# Patient Record
Sex: FEMALE | Race: WHITE | Employment: FULL TIME | URBAN - NONMETROPOLITAN AREA
[De-identification: names, ages, dates, MRNs, and addresses within clinical notes are randomized per-mention and may not be internally consistent; named-entity substitution may affect disease eponyms.]

---

## 2017-11-30 RX ORDER — NORGESTIMATE AND ETHINYL ESTRADIOL
KIT
Qty: 28 TABLET | Refills: 12 | Status: SHIPPED | OUTPATIENT
Start: 2017-11-30 | End: 2019-06-25 | Stop reason: ALTCHOICE

## 2019-06-25 ENCOUNTER — OFFICE VISIT (OUTPATIENT)
Dept: FAMILY MEDICINE CLINIC | Age: 39
End: 2019-06-25
Payer: COMMERCIAL

## 2019-06-25 VITALS
OXYGEN SATURATION: 98 % | DIASTOLIC BLOOD PRESSURE: 86 MMHG | WEIGHT: 203 LBS | SYSTOLIC BLOOD PRESSURE: 132 MMHG | BODY MASS INDEX: 32.62 KG/M2 | HEIGHT: 66 IN | HEART RATE: 79 BPM

## 2019-06-25 DIAGNOSIS — Z00.00 PREVENTATIVE HEALTH CARE: ICD-10-CM

## 2019-06-25 DIAGNOSIS — Z00.00 PREVENTATIVE HEALTH CARE: Primary | ICD-10-CM

## 2019-06-25 DIAGNOSIS — R41.840 CONCENTRATION DEFICIT: ICD-10-CM

## 2019-06-25 DIAGNOSIS — E28.2 PCOS (POLYCYSTIC OVARIAN SYNDROME): ICD-10-CM

## 2019-06-25 DIAGNOSIS — E28.2 PCO (POLYCYSTIC OVARIES): ICD-10-CM

## 2019-06-25 DIAGNOSIS — Z12.39 SCREENING FOR MALIGNANT NEOPLASM OF BREAST: ICD-10-CM

## 2019-06-25 LAB
ALBUMIN SERPL-MCNC: 4.3 G/DL (ref 3.5–4.6)
ALP BLD-CCNC: 54 U/L (ref 40–130)
ALT SERPL-CCNC: 8 U/L (ref 0–33)
ANION GAP SERPL CALCULATED.3IONS-SCNC: 15 MEQ/L (ref 9–15)
AST SERPL-CCNC: 15 U/L (ref 0–35)
BILIRUB SERPL-MCNC: 0.3 MG/DL (ref 0.2–0.7)
BUN BLDV-MCNC: 12 MG/DL (ref 6–20)
CALCIUM SERPL-MCNC: 8.9 MG/DL (ref 8.5–9.9)
CHLORIDE BLD-SCNC: 105 MEQ/L (ref 95–107)
CHOLESTEROL, TOTAL: 172 MG/DL (ref 0–199)
CO2: 20 MEQ/L (ref 20–31)
CREAT SERPL-MCNC: 0.67 MG/DL (ref 0.5–0.9)
GFR AFRICAN AMERICAN: >60
GFR NON-AFRICAN AMERICAN: >60
GLOBULIN: 2.8 G/DL (ref 2.3–3.5)
GLUCOSE BLD-MCNC: 98 MG/DL (ref 70–99)
HBA1C MFR BLD: 5.3 % (ref 4.8–5.9)
HCT VFR BLD CALC: 40.8 % (ref 37–47)
HDLC SERPL-MCNC: 52 MG/DL (ref 40–59)
HEMOGLOBIN: 13.7 G/DL (ref 12–16)
LDL CHOLESTEROL CALCULATED: 107 MG/DL (ref 0–129)
MCH RBC QN AUTO: 31.3 PG (ref 27–31.3)
MCHC RBC AUTO-ENTMCNC: 33.4 % (ref 33–37)
MCV RBC AUTO: 93.5 FL (ref 82–100)
PDW BLD-RTO: 14 % (ref 11.5–14.5)
PLATELET # BLD: 256 K/UL (ref 130–400)
POTASSIUM SERPL-SCNC: 4.2 MEQ/L (ref 3.4–4.9)
RBC # BLD: 4.37 M/UL (ref 4.2–5.4)
SODIUM BLD-SCNC: 140 MEQ/L (ref 135–144)
TOTAL PROTEIN: 7.1 G/DL (ref 6.3–8)
TRIGL SERPL-MCNC: 67 MG/DL (ref 0–150)
TSH SERPL DL<=0.05 MIU/L-ACNC: 1.43 UIU/ML (ref 0.44–3.86)
VITAMIN D 25-HYDROXY: 31.2 NG/ML (ref 30–100)
WBC # BLD: 7.2 K/UL (ref 4.8–10.8)

## 2019-06-25 PROCEDURE — 99386 PREV VISIT NEW AGE 40-64: CPT | Performed by: FAMILY MEDICINE

## 2019-06-25 ASSESSMENT — PATIENT HEALTH QUESTIONNAIRE - PHQ9
SUM OF ALL RESPONSES TO PHQ QUESTIONS 1-9: 0
1. LITTLE INTEREST OR PLEASURE IN DOING THINGS: 0
SUM OF ALL RESPONSES TO PHQ QUESTIONS 1-9: 0
SUM OF ALL RESPONSES TO PHQ9 QUESTIONS 1 & 2: 0
2. FEELING DOWN, DEPRESSED OR HOPELESS: 0

## 2019-06-25 NOTE — PROGRESS NOTES
Chief Complaint   Patient presents with    Establish Care     discuss ADD, brothers have it, starting to notice some symtoms        HPI:  Teri Jaramillo is a 37 y.o. 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 No current facility-administered medications for this visit. Past Medical History:   Diagnosis Date    Pap smear for cervical cancer screening 01/12/2011    neg.     PCO (polycystic ovaries)     PCOS (polycystic ovarian syndrome)      Past Surgical History:   Procedure Laterality Date    TONSILLECTOMY      TYMPANOSTOMY TUBE PLACEMENT      UTERINE FIBROID SURGERY       Family History   Problem Relation Age of Onset    Substance Abuse Father         alcohol    Cancer Paternal Grandfather         throat cancer    High Blood Pressure Mother     Asthma Mother     High Cholesterol Mother     Cancer Maternal Grandfather         lung     Social History     Socioeconomic History    Marital status: Single     Spouse name: None    Number of children: None    Years of education: None    Highest education level: None   Occupational History    None   Social Needs    Financial resource strain: None    Food insecurity:     Worry: None     Inability: None    Transportation needs:     Medical: None     Non-medical: None   Tobacco Use    Smoking status: Current Every Day Smoker     Packs/day: 0.50     Years: 10.00     Pack years: 5.00     Types: Cigarettes    Smokeless tobacco: Never Used   Substance and Sexual Activity    Alcohol use: Yes     Comment: occasionally    Drug use: No    Sexual activity: Not Currently   Lifestyle  Physical activity:     Days per week: None     Minutes per session: None    Stress: None   Relationships    Social connections:     Talks on phone: None     Gets together: None     Attends Faith service: None     Active member of club or organization: None     Attends meetings of clubs or organizations: None     Relationship status: None    Intimate partner violence:     Fear of current or ex partner: None     Emotionally abused: None     Physically abused: None     Forced sexual activity: None   Other Topics Concern    None   Social History Narrative    None     Allergies   Allergen Reactions    Codeine        Review of Systems:   General ROS: negative for - chills, fatigue, fever, malaise, weight gain or weight loss  Respiratory ROS: no cough, shortness of breath, or wheezing  Cardiovascular ROS: no chest pain or dyspnea on exertion  Gastrointestinal ROS: no abdominal pain, change in bowel habits, or black or bloody stools  Genito-Urinary ROS: no dysuria, trouble voiding  Musculoskeletal ROS: negative for - gait disturbance, joint pain or joint stiffness  Neurological ROS: per HPI  In general patient otherwise reports feeling well. Physical Exam:  /86 (Site: Right Upper Arm)   Pulse 79   Ht 5' 6\" (1.676 m)   Wt 203 lb (92.1 kg)   SpO2 98%   Breastfeeding? No   BMI 32.77 kg/m²     Gen: Well, NAD, Alert, Oriented x 3   HEENT: EOMI, eyes clear, MMM  Skin: without rash or jaundice  Neck: no significant lymphadenopathy or thyromegaly  Lungs: CTA B w/out Rales/Wheezes/Rhonchi, Good respiratory effort   Heart: RRR, S1S2, w/out M/R/G, non-displaced PMI   Ext: No C/C/E Bilaterally. Neuro: Neurovascularly intact w/ Sensory/Motor intact UE/LE Bilaterally.     Lab Results   Component Value Date    WBC 7.8 10/02/2014    HGB 14.3 10/02/2014    HCT 45.0 10/02/2014     10/02/2014    CHOL 199 10/02/2014    TRIG 93 10/02/2014    HDL 51 10/02/2014    ALT 18 10/02/2014    AST 19 10/02/2014    NA

## 2019-06-27 LAB — HIV 1,2 COMBO ANTIGEN/ANTIBODY: NEGATIVE

## 2019-07-02 ENCOUNTER — HOSPITAL ENCOUNTER (OUTPATIENT)
Dept: WOMENS IMAGING | Age: 39
Discharge: HOME | End: 2019-07-04
Payer: COMMERCIAL

## 2019-07-02 DIAGNOSIS — Z12.39 SCREENING FOR MALIGNANT NEOPLASM OF BREAST: ICD-10-CM

## 2019-07-02 PROCEDURE — 77063 BREAST TOMOSYNTHESIS BI: CPT

## 2019-07-05 DIAGNOSIS — R92.8 ABNORMAL MAMMOGRAPHY: Primary | ICD-10-CM

## 2019-11-19 ENCOUNTER — OFFICE VISIT (OUTPATIENT)
Dept: FAMILY MEDICINE CLINIC | Age: 39
End: 2019-11-19
Payer: COMMERCIAL

## 2019-11-19 VITALS
BODY MASS INDEX: 33.03 KG/M2 | WEIGHT: 205.5 LBS | OXYGEN SATURATION: 99 % | DIASTOLIC BLOOD PRESSURE: 70 MMHG | HEIGHT: 66 IN | TEMPERATURE: 97.5 F | SYSTOLIC BLOOD PRESSURE: 120 MMHG | HEART RATE: 68 BPM

## 2019-11-19 DIAGNOSIS — R59.1 LYMPHADENOPATHY OF HEAD AND NECK: Primary | ICD-10-CM

## 2019-11-19 PROCEDURE — 99213 OFFICE O/P EST LOW 20 MIN: CPT | Performed by: NURSE PRACTITIONER

## 2019-11-19 ASSESSMENT — ENCOUNTER SYMPTOMS
SINUS PAIN: 0
RHINORRHEA: 0
VISUAL CHANGE: 0
SORE THROAT: 0
SINUS PRESSURE: 0

## 2019-11-20 ENCOUNTER — HOSPITAL ENCOUNTER (OUTPATIENT)
Dept: ULTRASOUND IMAGING | Age: 39
Discharge: HOME | End: 2019-11-22
Payer: COMMERCIAL

## 2019-11-20 DIAGNOSIS — R59.1 LYMPHADENOPATHY OF HEAD AND NECK: ICD-10-CM

## 2019-11-20 PROCEDURE — 76999 ECHO EXAMINATION PROCEDURE: CPT

## 2019-11-25 DIAGNOSIS — R59.1 LYMPHADENOPATHY OF HEAD AND NECK: Primary | ICD-10-CM

## 2020-11-18 ENCOUNTER — NURSE ONLY (OUTPATIENT)
Dept: PRIMARY CARE CLINIC | Age: 40
End: 2020-11-18

## 2020-11-18 DIAGNOSIS — Z20.822 ENCOUNTER FOR SCREENING LABORATORY TESTING FOR COVID-19 VIRUS: ICD-10-CM

## 2020-11-21 LAB
SARS-COV-2: NOT DETECTED
SOURCE: NORMAL

## 2021-01-14 ENCOUNTER — VIRTUAL VISIT (OUTPATIENT)
Dept: FAMILY MEDICINE CLINIC | Age: 41
End: 2021-01-14
Payer: COMMERCIAL

## 2021-01-14 DIAGNOSIS — B96.89 ACUTE BACTERIAL SINUSITIS: Primary | ICD-10-CM

## 2021-01-14 DIAGNOSIS — J01.90 ACUTE BACTERIAL SINUSITIS: Primary | ICD-10-CM

## 2021-01-14 PROCEDURE — 99213 OFFICE O/P EST LOW 20 MIN: CPT | Performed by: NURSE PRACTITIONER

## 2021-01-14 RX ORDER — AMOXICILLIN 500 MG/1
500 CAPSULE ORAL 2 TIMES DAILY
Qty: 20 CAPSULE | Refills: 0 | Status: SHIPPED | OUTPATIENT
Start: 2021-01-14 | End: 2021-01-24

## 2021-01-14 SDOH — ECONOMIC STABILITY: FOOD INSECURITY: WITHIN THE PAST 12 MONTHS, YOU WORRIED THAT YOUR FOOD WOULD RUN OUT BEFORE YOU GOT MONEY TO BUY MORE.: NEVER TRUE

## 2021-01-14 SDOH — ECONOMIC STABILITY: TRANSPORTATION INSECURITY
IN THE PAST 12 MONTHS, HAS LACK OF TRANSPORTATION KEPT YOU FROM MEETINGS, WORK, OR FROM GETTING THINGS NEEDED FOR DAILY LIVING?: NO

## 2021-01-14 SDOH — ECONOMIC STABILITY: INCOME INSECURITY: HOW HARD IS IT FOR YOU TO PAY FOR THE VERY BASICS LIKE FOOD, HOUSING, MEDICAL CARE, AND HEATING?: NOT HARD AT ALL

## 2021-01-14 ASSESSMENT — ENCOUNTER SYMPTOMS
CHEST TIGHTNESS: 1
SHORTNESS OF BREATH: 0
COUGH: 1
RHINORRHEA: 0
WHEEZING: 0
NAUSEA: 0
SINUS PRESSURE: 1
DIARRHEA: 0
SORE THROAT: 1
VOMITING: 0

## 2021-01-14 ASSESSMENT — PATIENT HEALTH QUESTIONNAIRE - PHQ9
2. FEELING DOWN, DEPRESSED OR HOPELESS: 0
SUM OF ALL RESPONSES TO PHQ QUESTIONS 1-9: 0
SUM OF ALL RESPONSES TO PHQ QUESTIONS 1-9: 0

## 2021-01-14 NOTE — PROGRESS NOTES
The location for this appointment was the [facility] primary care site. TELEHEALTH APPOINTMENT  Patient has been screened to determine that this visit qualifies for a \"Video Visit\". This visit was via video due to the restrictions of the COVID-19 pandemic. All issues as below were discussed and addressed but note a limited visually based physical exam was performed. If it was felt the patient should be evaluated in the clinic there will be comment below demonstrating they were directed there. The patient is aware and has given verbal consent to be billed for this video encounter. The resources used for this visit were [employer] for chart access and [employer]. me    Chief Complaint   Patient presents with    Sinus Problem     did rapid tested neg. head congestion, cough, no gi sx       HPI  Patient is here for covid testing. Had positive patient covid exposure last 1/7. (works as paramedic)  Symptomatic 1/10  Rapid test 1/11 negative  PCR testing done yesterday through company. No results back yet. Feels lots of nasal congestion. Gets a lot of sinus infections, and it feels just like that. Usually tylenol sinus and cold usually works, but is not this time. PMH:    No current outpatient medications on file prior to visit. No current facility-administered medications on file prior to visit. Past Medical History:   Diagnosis Date    Pap smear for cervical cancer screening 01/12/2011    neg.     PCO (polycystic ovaries)     PCOS (polycystic ovarian syndrome)      Past Surgical History:   Procedure Laterality Date    TONSILLECTOMY      TYMPANOSTOMY TUBE PLACEMENT      UTERINE FIBROID SURGERY       Social History     Socioeconomic History    Marital status: Single     Spouse name: Not on file    Number of children: Not on file    Years of education: Not on file    Highest education level: Not on file   Occupational History    Not on file   Social Needs  Financial resource strain: Not hard at all   [employer] insecurity     Worry: Never true     Inability: Never true    Transportation needs     Medical: No     Non-medical: No   Tobacco Use    Smoking status: Current Every Day Smoker     Packs/day: 0.50     Years: 10.00     Pack years: 5.00     Types: Cigarettes    Smokeless tobacco: Never Used   Substance and Sexual Activity    Alcohol use: Yes     Comment: occasionally    Drug use: No    Sexual activity: Not Currently   Lifestyle    Physical activity     Days per week: Not on file     Minutes per session: Not on file    Stress: Not on file   Relationships    Social connections     Talks on phone: Not on file     Gets together: Not on file     Attends Bahai service: Not on file     Active member of club or organization: Not on file     Attends meetings of clubs or organizations: Not on file     Relationship status: Not on file    Intimate partner violence     Fear of current or ex partner: Not on file     Emotionally abused: Not on file     Physically abused: Not on file     Forced sexual activity: Not on file   Other Topics Concern    Not on file   Social History Narrative    Not on file     Family History   Problem Relation Age of Onset    Substance Abuse Father         alcohol    Cancer Paternal Grandfather         throat cancer    High Blood Pressure Mother     Asthma Mother     High Cholesterol Mother     Cancer Maternal Grandfather         lung     Allergies:  Codeine    Review of Systems   Constitutional: Negative for chills, diaphoresis and fever. HENT: Positive for congestion (nasal), postnasal drip, sinus pressure and sore throat (with coughing). Negative for rhinorrhea. Respiratory: Positive for cough (dry) and chest tightness. Negative for shortness of breath and wheezing. Gastrointestinal: Negative for diarrhea, nausea and vomiting. Musculoskeletal: Negative for myalgias. Neurological: Positive for headaches. PHYSICAL EXAM/ RESULTS    (Limited exam: only performed what is available through a visual exam during the video encounter as indicated due to the restrictions of the COVID-19 pandemic)    There were no vitals taken for this visit. Physical Exam  Vitals signs reviewed. Constitutional:       General: She is not in acute distress. Appearance: She is well-developed. She is not diaphoretic. HENT:      Head: Normocephalic and atraumatic. Right Ear: External ear normal.      Left Ear: External ear normal.      Nose: Congestion present. Neck:      Musculoskeletal: Normal range of motion and neck supple. Pulmonary:      Effort: Pulmonary effort is normal. No respiratory distress. Skin:     General: Skin is warm and dry. Neurological:      Mental Status: She is alert. Psychiatric:         Behavior: Behavior normal.         Recent Results (from the past 2016 hour(s))   COVID-19 Ambulatory    Collection Time: 11/18/20  7:22 PM    Specimen: Nasopharyngeal Swab   Result Value Ref Range    SARS-CoV-2 Not Detected Not Detected    Source Anterior nares            Assessment:       Diagnosis Orders   1. Acute bacterial sinusitis  amoxicillin (AMOXIL) 500 MG capsule         No orders of the defined types were placed in this encounter. Plan:   Return if symptoms worsen or fail to improve, for follow up with your PCP. There are no Patient Instructions on file for this visit. Side effects and adverse effects of any medication prescribed today, as well as treatment plan/rationale, follow-up care, and result expectations have been discussed with the patient. Expresses understanding and desires to proceed with treatment plan. Discussed signs and symptoms which require immediate follow-up in ED/call to 911. Understanding verbalized. I have reviewed and updated the electronic medical record. As always, if symptoms worsen, go directly to ED. The resources used for this visit were [employer] for chart access and [employer]. SON Harris CNP      An  electronic signature was used to authenticate this note. --SON Herr CNP on 1/14/2021 at 2:16 PM        Pursuant to the emergency declaration under the [address] [place] [address] and the [employer] and [employer] Act, this Virtual  Visit was conducted, with patient's consent, to reduce the patient's risk of exposure to COVID-19 and provide continuity of care for an established patient. Services were provided through a video synchronous discussion virtually to substitute for in-person clinic visit.

## 2021-07-31 ENCOUNTER — HOSPITAL ENCOUNTER (EMERGENCY)
Age: 41
Discharge: HOME | End: 2021-07-31
Attending: EMERGENCY MEDICINE
Payer: COMMERCIAL

## 2021-07-31 ENCOUNTER — APPOINTMENT (OUTPATIENT)
Dept: GENERAL RADIOLOGY | Age: 41
End: 2021-07-31
Payer: COMMERCIAL

## 2021-07-31 VITALS
TEMPERATURE: 98 F | OXYGEN SATURATION: 94 % | DIASTOLIC BLOOD PRESSURE: 82 MMHG | HEART RATE: 107 BPM | SYSTOLIC BLOOD PRESSURE: 117 MMHG | WEIGHT: 200 LBS | BODY MASS INDEX: 32.14 KG/M2 | RESPIRATION RATE: 20 BRPM | HEIGHT: 66 IN

## 2021-07-31 DIAGNOSIS — S86.011A RUPTURE OF RIGHT ACHILLES TENDON, INITIAL ENCOUNTER: Primary | ICD-10-CM

## 2021-07-31 PROCEDURE — 6370000000 HC RX 637 (ALT 250 FOR IP): Performed by: EMERGENCY MEDICINE

## 2021-07-31 PROCEDURE — 99284 EMERGENCY DEPT VISIT MOD MDM: CPT

## 2021-07-31 PROCEDURE — 73610 X-RAY EXAM OF ANKLE: CPT

## 2021-07-31 RX ORDER — HYDROCODONE BITARTRATE AND ACETAMINOPHEN 5; 325 MG/1; MG/1
1 TABLET ORAL EVERY 6 HOURS PRN
Qty: 20 TABLET | Refills: 0 | Status: SHIPPED | OUTPATIENT
Start: 2021-07-31 | End: 2021-08-05

## 2021-07-31 RX ORDER — HYDROCODONE BITARTRATE AND ACETAMINOPHEN 5; 325 MG/1; MG/1
2 TABLET ORAL ONCE
Status: COMPLETED | OUTPATIENT
Start: 2021-07-31 | End: 2021-07-31

## 2021-07-31 RX ADMIN — HYDROCODONE BITARTRATE AND ACETAMINOPHEN 2 TABLET: 5; 325 TABLET ORAL at 21:30

## 2021-07-31 ASSESSMENT — ENCOUNTER SYMPTOMS
EYE DISCHARGE: 0
VOMITING: 0
COUGH: 0
SORE THROAT: 0
ABDOMINAL PAIN: 0
CHEST TIGHTNESS: 0
WHEEZING: 0
ABDOMINAL DISTENTION: 0
PHOTOPHOBIA: 0
SHORTNESS OF BREATH: 0

## 2021-07-31 ASSESSMENT — PAIN DESCRIPTION - LOCATION: LOCATION: ANKLE;LEG

## 2021-07-31 ASSESSMENT — PAIN DESCRIPTION - DESCRIPTORS: DESCRIPTORS: CRAMPING

## 2021-07-31 ASSESSMENT — PAIN DESCRIPTION - PAIN TYPE
TYPE: ACUTE PAIN
TYPE: ACUTE PAIN

## 2021-07-31 ASSESSMENT — PAIN SCALES - GENERAL
PAINLEVEL_OUTOF10: 7
PAINLEVEL_OUTOF10: 7
PAINLEVEL_OUTOF10: 0

## 2021-07-31 ASSESSMENT — PAIN DESCRIPTION - FREQUENCY: FREQUENCY: INTERMITTENT

## 2021-07-31 ASSESSMENT — PAIN DESCRIPTION - ORIENTATION: ORIENTATION: LOWER;RIGHT

## 2021-08-01 NOTE — ED NOTES
Pt was given d/c instructions, follow up care instructions, and prescriptions. Pt at this time is a+ox4, no signs of distress, and was taken via w/c to her friends care. Pt has no questions and states understanding of information given. Pts friend Emil arrived to drive pt home at this time.       Moody Nelson RN  07/31/21 [id number]

## 2021-08-01 NOTE — ED NOTES
pts splint was placed and noted MSP's prior to and after placement. Pt states understanding of crutch use. pts ride is en route to the er for her transport home. Pt has no needs at this time.       Napoleon Santos RN  07/31/21 [id number]

## 2021-08-01 NOTE — ED NOTES
Bed: 03  Expected date: 7/31/21  Expected time: 8:55 PM  Means of arrival:   Comments:  Sunitha Cameron RN  07/31/21 2059

## 2021-08-01 NOTE — ED TRIAGE NOTES
Pt states that she was playing softball and was running to base and felt and snap in her right lower leg and lost feeling in her right foot. Pt states that she now has cramping pain in her right calf. Pt states that she can not walk on it with out severe pain.

## 2021-08-05 ENCOUNTER — OFFICE VISIT (OUTPATIENT)
Dept: ORTHOPEDIC SURGERY | Age: 41
End: 2021-08-05
Payer: COMMERCIAL

## 2021-08-05 ENCOUNTER — HOSPITAL ENCOUNTER (OUTPATIENT)
Dept: MRI IMAGING | Age: 41
Discharge: HOME | End: 2021-08-07
Payer: COMMERCIAL

## 2021-08-05 VITALS
WEIGHT: 200 LBS | HEIGHT: 66 IN | OXYGEN SATURATION: 98 % | BODY MASS INDEX: 32.14 KG/M2 | HEART RATE: 115 BPM | TEMPERATURE: 97.2 F

## 2021-08-05 DIAGNOSIS — R26.9 GAIT ABNORMALITY: ICD-10-CM

## 2021-08-05 DIAGNOSIS — S86.011A RUPTURE OF RIGHT ACHILLES TENDON, INITIAL ENCOUNTER: Primary | ICD-10-CM

## 2021-08-05 DIAGNOSIS — S86.011A RUPTURE OF RIGHT ACHILLES TENDON, INITIAL ENCOUNTER: ICD-10-CM

## 2021-08-05 PROCEDURE — L4361 PNEUMA/VAC WALK BOOT PRE OTS: HCPCS | Performed by: ORTHOPAEDIC SURGERY

## 2021-08-05 PROCEDURE — 99204 OFFICE O/P NEW MOD 45 MIN: CPT | Performed by: ORTHOPAEDIC SURGERY

## 2021-08-05 PROCEDURE — 73721 MRI JNT OF LWR EXTRE W/O DYE: CPT

## 2021-08-05 PROCEDURE — MISCD282 ADJUSTA LIFT: Performed by: ORTHOPAEDIC SURGERY

## 2021-08-05 NOTE — PROGRESS NOTES
Subjective:      Patient ID: Tomy Rogers is a 35 y.o. female who presents today for:  Chief Complaint   Patient presents with   [facility] ED Follow-up     ruputure of right achilles tendon. Xray done 07/31/2021. She was playing softball and running when the injury happened. HPI  Is in for evaluation of her right lower extremity. Sustained injury when she was playing softball. Was seen in the emergency room. ER records were evaluated. Comes in at this time for follow-up. Past Medical History:   Diagnosis Date    Pap smear for cervical cancer screening 01/12/2011    neg.  PCO (polycystic ovaries)     PCOS (polycystic ovarian syndrome)       Past Surgical History:   Procedure Laterality Date    TONSILLECTOMY      TYMPANOSTOMY TUBE PLACEMENT      UTERINE FIBROID SURGERY       Social History     Socioeconomic History    Marital status: Single     Spouse name: Not on file    Number of children: Not on file    Years of education: Not on file    Highest education level: Not on file   Occupational History    Not on file   Tobacco Use    Smoking status: Current Every Day Smoker     Packs/day: 0.50     Years: 10.00     Pack years: 5.00     Types: Cigarettes    Smokeless tobacco: Never Used   Substance and Sexual Activity    Alcohol use: Yes     Comment: occasionally    Drug use: No    Sexual activity: Not Currently   Other Topics Concern    Not on file   Social History Narrative    Not on file     Social Determinants of Health     Financial Resource Strain: Low Risk     Difficulty of Paying Living Expenses: Not hard at all   Food Insecurity: No Food Insecurity    Worried About Running Out of Food in the Last Year: Never true    Minnie of Food in the Last Year: Never true   Transportation Needs: No Transportation Needs    Lack of Transportation (Medical): No    Lack of Transportation (Non-Medical):  No   Physical Activity:     Days of Exercise per Week:     Minutes of Exercise per Session: Stress:     Feeling of Stress :    Social Connections:     Frequency of Communication with Friends and Family:     Frequency of Social Gatherings with Friends and Family:     Attends Quaker Services:     Active Member of Clubs or Organizations:     Attends Club or Organization Meetings:     Marital Status:    Intimate Partner Violence:     Fear of Current or Ex-Partner:     Emotionally Abused:     Physically Abused:     Sexually Abused:      Family History   Problem Relation Age of Onset    Substance Abuse Father         alcohol    Cancer Paternal Grandfather         throat cancer    High Blood Pressure Mother     Asthma Mother     High Cholesterol Mother     Cancer Maternal Grandfather         lung     Allergies   Allergen Reactions    Codeine      Current Outpatient Medications on File Prior to Visit   Medication Sig Dispense Refill    HYDROcodone-acetaminophen (NORCO) 5-325 MG per tablet Take 1 tablet by mouth every 6 hours as needed for Pain for up to 5 days. 20 tablet 0     No current facility-administered medications on file prior to visit. Review of Systems  Planes of pain in the right lower extremity. Denies any numbness and tingling. Objective:   Pulse 115   Temp 97.2 °F (36.2 °C) (Temporal)   Ht 5' 6\" (1.676 m)   Wt 200 lb (90.7 kg)   LMP 07/25/2021   SpO2 98%   BMI 32.28 kg/m²     Ortho Exam  Right lower extremity some ecchymosis in the distal aspect of the right lower extremity going into the posterior foot area. Defect is palpated on the Achilles. There is no plantarflexion with Day test.  There is weakness to plantar flexion. No difficulty with a dorsiflexion of the foot. No pain on the medial lateral malleolus. No pain on the calf region. The calf is supple without any tightness. But there is definite tenderness with palpate the Achilles. Radiographs and Laboratory Studies:     Diagnostic Imaging Studies:     The x-rays she had performed the emergency room does not show any fracture dislocations. Laboratory Studies:   Lab Results   Component Value Date    WBC 7.2 06/25/2019    HGB 13.7 06/25/2019    HCT 40.8 06/25/2019    MCV 93.5 06/25/2019     06/25/2019     No results found for: SEDRATE  No results found for: CRP    Assessment:      Diagnosis Orders   1. Rupture of right Achilles tendon, initial encounter     2. Gait abnormality            Plan: At this time I discussed the problem with the patient I feel that she has ruptured her Achilles tendon. My recommendation will be to place her in a boot with heel lift. We will obtain an MRI evaluation of the Achilles to further evaluate the injury. We will have her return next week after the MRI. I will be out of the office I will have her follow-up with one of my associates. At that point they can discuss the possibly of surgical versus nonsurgical treatment if it shows complete rupture. We will have her take aspirin 325 mg every day to hold fully prevent any DVTs from occurring. No orders of the defined types were placed in this encounter. No orders of the defined types were placed in this encounter. No follow-ups on file.       Robert De La Torre MD

## 2021-08-13 ENCOUNTER — OFFICE VISIT (OUTPATIENT)
Dept: ORTHOPEDIC SURGERY | Age: 41
End: 2021-08-13
Payer: COMMERCIAL

## 2021-08-13 VITALS
BODY MASS INDEX: 32.14 KG/M2 | TEMPERATURE: 97.4 F | HEIGHT: 66 IN | WEIGHT: 200 LBS | HEART RATE: 99 BPM | OXYGEN SATURATION: 97 %

## 2021-08-13 DIAGNOSIS — S86.011A RUPTURE OF RIGHT ACHILLES TENDON, INITIAL ENCOUNTER: Primary | ICD-10-CM

## 2021-08-13 PROCEDURE — 99214 OFFICE O/P EST MOD 30 MIN: CPT | Performed by: ORTHOPAEDIC SURGERY

## 2021-08-13 RX ORDER — HYDROCODONE BITARTRATE AND ACETAMINOPHEN 5; 325 MG/1; MG/1
1 TABLET ORAL EVERY 6 HOURS PRN
COMMUNITY
End: 2021-08-31 | Stop reason: ALTCHOICE

## 2021-08-13 ASSESSMENT — ENCOUNTER SYMPTOMS: BACK PAIN: 0

## 2021-08-13 NOTE — PROGRESS NOTES
Subjective:      Patient ID: John Robin is a 35 y.o. female who presents today for:  Chief Complaint   Patient presents with    Follow-up     go over MRI ordered by Dr Jerry Bacon. HPI  35 yoa female injured right ankle during a softball game. Was running from [employer] base and felt a pop and pain posterior calf on right. No recent pain, but remote injury to right ankle/foot. Occurred 2 weeks ago. Initially splinted and now in boot for a week. Past Medical History:   Diagnosis Date    Pap smear for cervical cancer screening 01/12/2011    neg.  PCO (polycystic ovaries)     PCOS (polycystic ovarian syndrome)       Past Surgical History:   Procedure Laterality Date    TONSILLECTOMY      TYMPANOSTOMY TUBE PLACEMENT      UTERINE FIBROID SURGERY       Social History     Socioeconomic History    Marital status: Single     Spouse name: Not on file    Number of children: Not on file    Years of education: Not on file    Highest education level: Not on file   Occupational History    Not on file   Tobacco Use    Smoking status: Current Every Day Smoker     Packs/day: 0.50     Years: 10.00     Pack years: 5.00     Types: Cigarettes    Smokeless tobacco: Never Used   Substance and Sexual Activity    Alcohol use: Yes     Comment: occasionally    Drug use: No    Sexual activity: Not Currently   Other Topics Concern    Not on file   Social History Narrative    Not on file     Social Determinants of Health     Financial Resource Strain: Low Risk     Difficulty of Paying Living Expenses: Not hard at all   Food Insecurity: No Food Insecurity    Worried About Running Out of Food in the Last Year: Never true    Minnie of Food in the Last Year: Never true   Transportation Needs: No Transportation Needs    Lack of Transportation (Medical): No    Lack of Transportation (Non-Medical):  No   Physical Activity:     Days of Exercise per Week:     Minutes of Exercise per Session:    Stress:     Feeling of Stress :    Social Connections:     Frequency of Communication with Friends and Family:     Frequency of Social Gatherings with Friends and Family:     Attends Baptist Services:     Active Member of Clubs or Organizations:     Attends Club or Organization Meetings:     Marital Status:    Intimate Partner Violence:     Fear of Current or Ex-Partner:     Emotionally Abused:     Physically Abused:     Sexually Abused:      Family History   Problem Relation Age of Onset    Substance Abuse Father         alcohol    Cancer Paternal Grandfather         throat cancer    High Blood Pressure Mother     Asthma Mother     High Cholesterol Mother     Cancer Maternal Grandfather         lung     Allergies   Allergen Reactions    Codeine      Current Outpatient Medications on File Prior to Visit   Medication Sig Dispense Refill    HYDROcodone-acetaminophen ([address]) 5-325 MG per tablet Take 1 tablet by mouth every 6 hours as needed for Pain. No current facility-administered medications on file prior to visit. Review of Systems   Constitutional: Negative for fever. Cardiovascular: Negative for leg swelling. Musculoskeletal: Negative for arthralgias, back pain, gait problem, joint swelling and neck pain. Skin: Negative for rash. Neurological: Negative for weakness and numbness. Objective:   Pulse 99   Temp 97.4 °F (36.3 °C) (Temporal)   Ht 5' 6\" (1.676 m)   Wt 200 lb (90.7 kg)   LMP 07/25/2021   SpO2 97%   BMI 32.28 kg/m²     Ortho Exam  Ecchymoses of right posterior ankle. Skin intact. Tender mid portion of right achilles. Negative homans sign bilateral.  Day test demonstrates deficit on right. Peroneal and tibial n. Motor and sensory intact. DP palpable and symmetric bilateral.    Radiographs and Laboratory Studies:     Diagnostic Imaging Studies:    Patient had plain x-rays of the left ankle on 7/31/2021. There was some mild degenerative changes.   No acute abnormality. There was an ossicle proximal in the region of the Achilles shadow. I reviewed MRI of the right ankle obtained 2021. This was consistent with a right Achilles tendon rupture. Laboratory Studies:   Lab Results   Component Value Date    WBC 7.2 2019    HGB 13.7 2019    HCT 40.8 2019    MCV 93.5 2019     2019     No results found for: SEDRATE  No results found for: CRP    Assessment:      Diagnosis Orders   1. Rupture of right Achilles tendon, initial encounter            Plan:     I discussed the natural history of this with the patient. Reviewed nonoperative and operative options. 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 She wants to proceed with an operative approach. We discussed with the delay in treatment higher risk. At this time we discussed open repair of the Achilles tendon. She was informed of the incisions,. Immobilization recovery. She was informed to risk to include but not limited to rerupture, infection, wound complications, arthrofibrosis, DVT, pulmonary embolus, neurovascular injury, complex regional pain syndrome, medical anesthetic risk. All questions were answered and consent was obtained. No orders of the defined types were placed in this encounter. No orders of the defined types were placed in this encounter. Return for Post operative.       Regi Eckert MD         Surgery Phone: [phone]   [facility]   Surgery Fax: [phone]    Phone: [phone]          Fax: [phone]    Orthopedics: Surgery Scheduling, PAT & PRE-OP Order Form  Call to advance [place] at [phone] at least 24 hours prior to date of service     Surgery Location: [facility] Surgery: [address] [place] [address]  OFFICE   Surgeon's Lorraine Warren MD Surgery Date:    Time:    Patient's Name: Yany Zimmerman : 1980    SS#: [id number]  Gender: female  Home Phone:  [phone] Cell Phone: [phone]  Emergency Contact:  Leslie Ovalles   Phone: [phone]  Payor: Adelfo Marmolejo /  /  /    ID No.: [id number]      PROVIDER TO COMPLETE:  Diagnosis: Right achilles tendon rupture. Procedure/Consent: Right achilles tendon repair. CPT CODES:    Case Comments/Implants: #2 fiberwire,#2 orthocord, #2 ethibond. Surgery Scheduled as:  Outpatient  Anesthesia Requested: General  Referring Family Doctor: MD AISSATOU West  [x] Luis Miguel REYEZ Date/Time:                                                            [x] History & Physical [] Physician will Provide [] Attached [] Dictated [] Other  [x] Follow Anesthesia Pre-Op Orders for X-rays, [facility]     [x] SN & PT to evaluate and treat/educate disease management, medications, home safety & equipment needs for total joint patients  [] Other: ____________________________________________________  Consults: Medical/Cardiac Clearance done by  ____________________  PRE-OP ORDERS:   Allergies: Codeine Latex Allergies:             Diabetic:           [] IV ________________________  [x] IV Start with J-loop     Preprinted Orders: Attached [] Yes [] No   ANTIBIOTIC PRE-OP: [x] ANCEF 2 gram IVPB if > 120 kg 3 grams IVPB within 1 hour of incision, if ALLERGIC, use VANCOMYCIN 1 gram IV, 2 hours pre-op  [] TXA Protocol [] Other:   [x] NPO   [] Betablocker (if needed) _____________________________________   [] Knee high anti-embolic hose [] Thigh high anti-embolic hose   Other: ______________________________________________________    Physician Signature Required:    Date/Time: 8/13/2021

## 2021-08-16 ENCOUNTER — OFFICE VISIT (OUTPATIENT)
Dept: FAMILY MEDICINE CLINIC | Age: 41
End: 2021-08-16
Payer: COMMERCIAL

## 2021-08-16 ENCOUNTER — HOSPITAL ENCOUNTER (OUTPATIENT)
Dept: LAB | Age: 41
Discharge: HOME | End: 2021-08-16
Payer: COMMERCIAL

## 2021-08-16 ENCOUNTER — TELEPHONE (OUTPATIENT)
Dept: ORTHOPEDIC SURGERY | Age: 41
End: 2021-08-16

## 2021-08-16 VITALS
DIASTOLIC BLOOD PRESSURE: 60 MMHG | HEIGHT: 66 IN | TEMPERATURE: 97.9 F | HEART RATE: 89 BPM | BODY MASS INDEX: 32.14 KG/M2 | OXYGEN SATURATION: 97 % | WEIGHT: 200 LBS | SYSTOLIC BLOOD PRESSURE: 110 MMHG

## 2021-08-16 DIAGNOSIS — Z01.818 PREOP EXAMINATION: ICD-10-CM

## 2021-08-16 DIAGNOSIS — Z01.818 PREOP EXAMINATION: Primary | ICD-10-CM

## 2021-08-16 LAB
BASOPHILS ABSOLUTE: 0.1 K/UL (ref 0–0.2)
BASOPHILS RELATIVE PERCENT: 0.7 %
BILIRUBIN URINE: NEGATIVE
BLOOD, URINE: NEGATIVE
CLARITY: CLEAR
COLOR: YELLOW
EOSINOPHILS ABSOLUTE: 0.2 K/UL (ref 0–0.7)
EOSINOPHILS RELATIVE PERCENT: 1.3 %
GLUCOSE URINE: NEGATIVE MG/DL
HCT VFR BLD CALC: 40.1 % (ref 37–47)
HEMOGLOBIN: 13.2 G/DL (ref 12–16)
KETONES, URINE: NEGATIVE MG/DL
LEUKOCYTE ESTERASE, URINE: NEGATIVE
LYMPHOCYTES ABSOLUTE: 3.5 K/UL (ref 1–4.8)
LYMPHOCYTES RELATIVE PERCENT: 26.3 %
MCH RBC QN AUTO: 30.6 PG (ref 27–31.3)
MCHC RBC AUTO-ENTMCNC: 32.8 % (ref 33–37)
MCV RBC AUTO: 93.3 FL (ref 82–100)
MONOCYTES ABSOLUTE: 1.3 K/UL (ref 0.2–0.8)
MONOCYTES RELATIVE PERCENT: 9.8 %
NEUTROPHILS ABSOLUTE: 8.2 K/UL (ref 1.4–6.5)
NEUTROPHILS RELATIVE PERCENT: 61.9 %
NITRITE, URINE: NEGATIVE
PDW BLD-RTO: 13.8 % (ref 11.5–14.5)
PH UA: 6 (ref 5–9)
PLATELET # BLD: 384 K/UL (ref 130–400)
PROTEIN UA: NEGATIVE MG/DL
RBC # BLD: 4.29 M/UL (ref 4.2–5.4)
SPECIFIC GRAVITY UA: 1.01 (ref 1–1.03)
URINE REFLEX TO CULTURE: NORMAL
UROBILINOGEN, URINE: 0.2 E.U./DL
WBC # BLD: 13.3 K/UL (ref 4.8–10.8)

## 2021-08-16 PROCEDURE — 36415 COLL VENOUS BLD VENIPUNCTURE: CPT

## 2021-08-16 PROCEDURE — 85025 COMPLETE CBC W/AUTO DIFF WBC: CPT

## 2021-08-16 PROCEDURE — 99214 OFFICE O/P EST MOD 30 MIN: CPT | Performed by: NURSE PRACTITIONER

## 2021-08-16 PROCEDURE — 87635 SARS-COV-2 COVID-19 AMP PRB: CPT

## 2021-08-16 PROCEDURE — 81003 URINALYSIS AUTO W/O SCOPE: CPT

## 2021-08-16 RX ORDER — ACETAMINOPHEN 500 MG
1000 TABLET ORAL EVERY 6 HOURS PRN
COMMUNITY
End: 2021-08-31 | Stop reason: ALTCHOICE

## 2021-08-16 ASSESSMENT — ENCOUNTER SYMPTOMS
COUGH: 0
DIARRHEA: 0
NAUSEA: 0
SORE THROAT: 0
SHORTNESS OF BREATH: 0
TROUBLE SWALLOWING: 0
ABDOMINAL PAIN: 0
CHEST TIGHTNESS: 0
VOMITING: 0

## 2021-08-16 NOTE — PROGRESS NOTES
HISTORY AND PHYSICAL             Date: 8/16/2021        Patient Name: Madelaine Severe     YOB: 1980      Age:  35 y.o. Chief Complaint     Chief Complaint   Patient presents with    Pre-op Exam     Dr.Mendel      History Obtained From   Patient    History of Present Illness   Beatriz Zambrano presents to the office today for a preoperative exam at the request of surgeon, Dr. Min Coombs who plans on performing [address] on August 20 at [facility]. The current problem began 7/31/21- injured while playing softball and symptoms have been unchanged with time. Conservative therapy: Yes: rest/ boot, which has been ineffective. Planned anesthesia: General   Known anesthesia problems: None known: hx of nausea when coming out of anesthesia reported    Bleeding risk: No recent or remote history of abnormal bleeding  Personal or FH of DVT/PE: No    Patient objection to receiving blood products: No    Last aspirin was 8/12/21. Past Medical History     Patient Active Problem List   Diagnosis    PCOS (polycystic ovarian syndrome)    PCO (polycystic ovaries)    Achilles rupture, right     Past Medical History:   Diagnosis Date    Pap smear for cervical cancer screening 01/12/2011    neg.  PCO (polycystic ovaries)     PCOS (polycystic ovarian syndrome)       Past Surgical History     Past Surgical History:   Procedure Laterality Date    TONSILLECTOMY      TYMPANOSTOMY TUBE PLACEMENT      UTERINE FIBROID SURGERY        Medications Prior to Admission     Prior to Admission medications    Medication Sig Start Date End Date Taking? Authorizing Provider   acetaminophen (TYLENOL) 500 MG tablet Take 1,000 mg by mouth every 6 hours as needed for Pain   Yes Historical Provider, MD   HYDROcodone-acetaminophen (NORCO) 5-325 MG per tablet Take 1 tablet by mouth every 6 hours as needed for Pain (hold until after procedure).    Patient not taking: Reported on 8/16/2021    Historical Provider, MD Allergies   Codeine  Social History     Social History     Tobacco History     Smoking Status  Current Every Day Smoker Smoking Frequency  0.5 packs/day for 10 years (5 pk yrs) Smoking Tobacco Type  Cigarettes    Smokeless Tobacco Use  Never Used          Alcohol History     Alcohol Use Status  Yes Comment  occasionally          Drug Use     Drug Use Status  No          Sexual Activity     Sexually Active  Not Currently              Family History     Family History   Problem Relation Age of Onset    Substance Abuse Father         alcohol    Cancer Paternal Grandfather         throat cancer    High Blood Pressure Mother     Asthma Mother     High Cholesterol Mother     Cancer Maternal Grandfather         lung     Review of Systems   Review of Systems   Constitutional: Negative for chills, fever and unexpected weight change. HENT: Negative for ear pain, mouth sores, nosebleeds, sore throat and trouble swallowing. Respiratory: Negative for cough, chest tightness, shortness of breath and wheezing. Cardiovascular: Negative for chest pain, palpitations and leg swelling. Gastrointestinal: Negative for abdominal pain, diarrhea, nausea and vomiting. Genitourinary: Negative for dysuria, hematuria and urgency. Musculoskeletal: Negative for joint swelling and myalgias. Skin: Negative for color change and rash. Neurological: Negative for seizures, syncope, weakness, light-headedness and headaches. Hematological: Does not bruise/bleed easily. Physical Exam     Vitals:    08/16/21 1349   BP: 110/60   Site: Right Upper Arm   Position: Sitting   Cuff Size: Medium Adult   Pulse: 89   Temp: 97.9 °F (36.6 °C)   TempSrc: Temporal   SpO2: 97%   Weight: 200 lb (90.7 kg)   Height: 5' 6\" (1.676 m)     Physical Exam  Vitals reviewed. Constitutional:       General: She is not in acute distress. Appearance: She is well-groomed. She is not toxic-appearing.       Comments: orthopedic boot on R foot   HENT: Head: Normocephalic and atraumatic. Jaw: There is normal jaw occlusion. Right Ear: External ear normal. No tenderness. Left Ear: External ear normal. No tenderness. Nose: Nose normal. No nasal tenderness. Mouth/Throat:      Lips: Pink. No lesions. Mouth: Mucous membranes are moist. No oral lesions. Pharynx: Oropharynx is clear. Uvula midline. No pharyngeal swelling or posterior oropharyngeal erythema. Tonsils: 0 on the right. 0 on the left. Eyes:      General: Lids are normal.      Extraocular Movements: Extraocular movements intact. Conjunctiva/sclera: Conjunctivae normal.      Pupils: Pupils are equal, round, and reactive to light. Neck:      Thyroid: No thyroid tenderness. Trachea: Trachea and phonation normal.   Cardiovascular:      Rate and Rhythm: Normal rate and regular rhythm. Pulses: Normal pulses. Heart sounds: S1 normal and S2 normal. No murmur heard. No friction rub. No gallop. Pulmonary:      Effort: Pulmonary effort is normal. No tachypnea. Breath sounds: Normal breath sounds and air entry. No wheezing. Abdominal:      General: Bowel sounds are normal. There is no distension. Palpations: Abdomen is soft. Tenderness: There is no abdominal tenderness. There is no right CVA tenderness or left CVA tenderness. Musculoskeletal:         General: Normal range of motion. Cervical back: Normal range of motion and neck supple. No muscular tenderness. Right lower leg: No edema. Left lower leg: No edema. Lymphadenopathy:      Cervical: No cervical adenopathy. Skin:     General: Skin is warm and dry. Findings: No rash. Comments: normal turgor   Neurological:      General: No focal deficit present. Mental Status: She is alert. Mental status is at baseline. Sensory: Sensation is intact. Gait: Gait is intact.    Psychiatric:         Mood and Affect: Mood and affect normal. Speech: Speech normal.       Labs    No results found for this or any previous visit (from the past 24 hour(s)). Lab Results   Component Value Date     06/25/2019    K 4.2 06/25/2019     06/25/2019    CO2 20 06/25/2019    BUN 12 06/25/2019    CREATININE 0.67 06/25/2019    GLUCOSE 98 06/25/2019    CALCIUM 8.9 06/25/2019     Lab Results   Component Value Date    WBC 7.2 06/25/2019    HGB 13.7 06/25/2019    HCT 40.8 06/25/2019    MCV 93.5 06/25/2019     06/25/2019     Imaging/Diagnostics Last 24 Hours   No results found. Cardiographics  ECG: No prior ECG  Echocardiogram: not done    Assessment    35 y.o. female with planned surgery as above. Known risk factors for perioperative complications: None      Current medications which may produce withdrawal symptoms if withheld perioperatively: None    No contraindications to planned surgery    1. Preop examination        Plan     1. Preoperative workup as follows:  Orders Placed This Encounter   Procedures    CBC With Auto Differential     Standing Status:   Future     Number of Occurrences:   1     Standing Expiration Date:   8/16/2022    Urine Reflex to Culture     Standing Status:   Future     Number of Occurrences:   1     Standing Expiration Date:   9/16/2021     Order Specific Question:   SPECIFY(EX-CATH,MIDSTREAM,CYSTO,ETC)? Answer:   mid-stream       2. Change in medication regimen before surgery: continue to hold ASA and NSAIDs before surgery  3. Deep vein thrombosis prophylaxis: regimen to be chosen by surgical team    Consultations Ordered:  None    Return for follow-up as needed should any signs or symptoms of illness develop prior to surgery. Side effects and adverse effects of any medication prescribed today, as well as treatment plan/rationale, follow-up care, and result expectations have been discussed with the patient. Srinivasa Murray expresses understanding and wishes to proceed with the plan.       Discussed signs and symptoms which require immediate follow-up in ED/call to 911. Understanding verbalized. I have reviewed and updated the electronic medical record.     Electronically signed by SON Palacios CNP on 8/16/2021 at 3:05 PM

## 2021-08-16 NOTE — PATIENT INSTRUCTIONS
Labs today -> we'll call with results  Nothing to eat or drink after midnight the night before surgery  Call if any new signs or symptoms of illness prior to surgery date    Patient Education   Achilles Tendon Repair: Before Your Surgery  What is an Achilles tendon repair? Achilles tendon repair reconnects the ends of the broken tendon. This lets you use your foot again in a normal way. You could have one of two types of surgery. In open surgery, the doctor makes a cut at the back of your leg. This cut is called an incision. In percutaneous (say \"per-corrinew-MITZI-neprincess-us\") surgery, the doctor uses a few smaller cuts. Tools for fixing the tendon are inserted through the cuts. Your doctor will decide which surgery will work best for you. Most people go home the same day as the surgery. How soon you can go back to work and your normal routine depends on your job. If you sit at work, you may be able to go back in 1 to 2 weeks. But if you are on your feet at work, it may take 6 to 8 weeks. If you are very physically active in your job, it may take 3 to 6 months. You will be in a cast or walking boot for 6 to 12 weeks after surgery. You may need physical therapy. Most people can return to sports in 4 to 6 months. Follow-up care is a key part of your treatment and safety. Be sure to make and go to all appointments, and call your doctor if you are having problems. It's also a good idea to know your test results and keep a list of the medicines you take. How do you prepare for surgery? Surgery can be stressful. This information will help you understand what you can expect. And it will help you safely prepare for surgery. Preparing for surgery    · Be sure you have someone to take you home.  Anesthesia and pain medicine will make it unsafe for you to drive or get home on your own.     · Understand exactly what surgery is planned, along with the risks, benefits, and other options.     · If you take aspirin or some other need to reschedule or have changed your mind about having the surgery. Where can you learn more? Go to [email or web address] and sign in to your [employer] account. Enter M732 in the [employer] box to learn more about \"Achilles Tendon Repair: Before Your Surgery. \"     If you do not have an account, please click on the \"Sign Up Now\" link. Current as of: November 16, 2020               Content Version: 12.9  © 2006-2021 [employer]wise, Incorporated. Care instructions adapted under license by [facility] ([facility]). If you have questions about a medical condition or this instruction, always ask your healthcare professional. [address] any warranty or liability for your use of this information.

## 2021-08-16 NOTE — TELEPHONE ENCOUNTER
Leonie BUENO  Insurance approved surgery with Dr. Nikolas Will on 08/20/2021             Insurance approved cpt code 96253  REF# [id number]

## 2021-08-17 LAB — SARS-COV-2, PCR: NOT DETECTED

## 2021-08-20 ENCOUNTER — ANESTHESIA EVENT (OUTPATIENT)
Dept: OPERATING ROOM | Age: 41
End: 2021-08-20
Payer: COMMERCIAL

## 2021-08-20 ENCOUNTER — ANESTHESIA (OUTPATIENT)
Dept: OPERATING ROOM | Age: 41
End: 2021-08-20
Payer: COMMERCIAL

## 2021-08-20 ENCOUNTER — HOSPITAL ENCOUNTER (OUTPATIENT)
Age: 41
Setting detail: OUTPATIENT SURGERY
Discharge: HOME | End: 2021-08-20
Attending: ORTHOPAEDIC SURGERY | Admitting: ORTHOPAEDIC SURGERY
Payer: COMMERCIAL

## 2021-08-20 VITALS
HEART RATE: 81 BPM | RESPIRATION RATE: 20 BRPM | WEIGHT: 200 LBS | DIASTOLIC BLOOD PRESSURE: 68 MMHG | HEIGHT: 66 IN | OXYGEN SATURATION: 98 % | TEMPERATURE: 97.7 F | SYSTOLIC BLOOD PRESSURE: 117 MMHG | BODY MASS INDEX: 32.14 KG/M2

## 2021-08-20 VITALS — DIASTOLIC BLOOD PRESSURE: 56 MMHG | SYSTOLIC BLOOD PRESSURE: 102 MMHG | OXYGEN SATURATION: 94 %

## 2021-08-20 DIAGNOSIS — S86.011D RUPTURE OF RIGHT ACHILLES TENDON, SUBSEQUENT ENCOUNTER: Primary | ICD-10-CM

## 2021-08-20 LAB
HCG, URINE, POC: NEGATIVE
Lab: NORMAL
NEGATIVE QC PASS/FAIL: NORMAL
POSITIVE QC PASS/FAIL: NORMAL

## 2021-08-20 PROCEDURE — 3600000004 HC SURGERY LEVEL 4 BASE: Performed by: ORTHOPAEDIC SURGERY

## 2021-08-20 PROCEDURE — 7100000010 HC PHASE II RECOVERY - FIRST 15 MIN: Performed by: ORTHOPAEDIC SURGERY

## 2021-08-20 PROCEDURE — 2580000003 HC RX 258: Performed by: ANESTHESIOLOGY

## 2021-08-20 PROCEDURE — 3700000000 HC ANESTHESIA ATTENDED CARE: Performed by: ORTHOPAEDIC SURGERY

## 2021-08-20 PROCEDURE — 6370000000 HC RX 637 (ALT 250 FOR IP): Performed by: ORTHOPAEDIC SURGERY

## 2021-08-20 PROCEDURE — 64445 NJX AA&/STRD SCIATIC NRV IMG: CPT | Performed by: ANESTHESIOLOGY

## 2021-08-20 PROCEDURE — 3600000014 HC SURGERY LEVEL 4 ADDTL 15MIN: Performed by: ORTHOPAEDIC SURGERY

## 2021-08-20 PROCEDURE — 7100000001 HC PACU RECOVERY - ADDTL 15 MIN: Performed by: ORTHOPAEDIC SURGERY

## 2021-08-20 PROCEDURE — 6360000002 HC RX W HCPCS: Performed by: ANESTHESIOLOGY

## 2021-08-20 PROCEDURE — 2709999900 HC NON-CHARGEABLE SUPPLY: Performed by: ORTHOPAEDIC SURGERY

## 2021-08-20 PROCEDURE — 7100000000 HC PACU RECOVERY - FIRST 15 MIN: Performed by: ORTHOPAEDIC SURGERY

## 2021-08-20 PROCEDURE — 27650 REPAIR ACHILLES TENDON: CPT | Performed by: ORTHOPAEDIC SURGERY

## 2021-08-20 PROCEDURE — 2580000003 HC RX 258: Performed by: ORTHOPAEDIC SURGERY

## 2021-08-20 PROCEDURE — 3700000001 HC ADD 15 MINUTES (ANESTHESIA): Performed by: ORTHOPAEDIC SURGERY

## 2021-08-20 PROCEDURE — 2500000003 HC RX 250 WO HCPCS: Performed by: ANESTHESIOLOGY

## 2021-08-20 PROCEDURE — 7100000011 HC PHASE II RECOVERY - ADDTL 15 MIN: Performed by: ORTHOPAEDIC SURGERY

## 2021-08-20 PROCEDURE — 6360000002 HC RX W HCPCS: Performed by: ORTHOPAEDIC SURGERY

## 2021-08-20 RX ORDER — PROPOFOL 10 MG/ML
INJECTION, EMULSION INTRAVENOUS PRN
Status: DISCONTINUED | OUTPATIENT
Start: 2021-08-20 | End: 2021-08-20 | Stop reason: SDUPTHER

## 2021-08-20 RX ORDER — DEXAMETHASONE SODIUM PHOSPHATE 4 MG/ML
INJECTION, SOLUTION INTRA-ARTICULAR; INTRALESIONAL; INTRAMUSCULAR; INTRAVENOUS; SOFT TISSUE PRN
Status: DISCONTINUED | OUTPATIENT
Start: 2021-08-20 | End: 2021-08-20 | Stop reason: SDUPTHER

## 2021-08-20 RX ORDER — GINSENG 100 MG
CAPSULE ORAL PRN
Status: DISCONTINUED | OUTPATIENT
Start: 2021-08-20 | End: 2021-08-20 | Stop reason: ALTCHOICE

## 2021-08-20 RX ORDER — MAGNESIUM HYDROXIDE 1200 MG/15ML
LIQUID ORAL CONTINUOUS PRN
Status: COMPLETED | OUTPATIENT
Start: 2021-08-20 | End: 2021-08-20

## 2021-08-20 RX ORDER — ROPIVACAINE HYDROCHLORIDE 5 MG/ML
INJECTION, SOLUTION EPIDURAL; INFILTRATION; PERINEURAL PRN
Status: DISCONTINUED | OUTPATIENT
Start: 2021-08-20 | End: 2021-08-20 | Stop reason: SDUPTHER

## 2021-08-20 RX ORDER — METOCLOPRAMIDE HYDROCHLORIDE 5 MG/ML
10 INJECTION INTRAMUSCULAR; INTRAVENOUS
Status: COMPLETED | OUTPATIENT
Start: 2021-08-20 | End: 2021-08-20

## 2021-08-20 RX ORDER — SODIUM CHLORIDE, SODIUM LACTATE, POTASSIUM CHLORIDE, CALCIUM CHLORIDE 600; 310; 30; 20 MG/100ML; MG/100ML; MG/100ML; MG/100ML
INJECTION, SOLUTION INTRAVENOUS CONTINUOUS PRN
Status: DISCONTINUED | OUTPATIENT
Start: 2021-08-20 | End: 2021-08-20 | Stop reason: SDUPTHER

## 2021-08-20 RX ORDER — CELECOXIB 100 MG/1
100 CAPSULE ORAL 2 TIMES DAILY
Qty: 30 CAPSULE | Refills: 0 | Status: SHIPPED | OUTPATIENT
Start: 2021-08-20 | End: 2021-09-20

## 2021-08-20 RX ORDER — MIDAZOLAM HYDROCHLORIDE 2 MG/2ML
INJECTION, SOLUTION INTRAMUSCULAR; INTRAVENOUS PRN
Status: DISCONTINUED | OUTPATIENT
Start: 2021-08-20 | End: 2021-08-20 | Stop reason: SDUPTHER

## 2021-08-20 RX ORDER — OXYCODONE HYDROCHLORIDE AND ACETAMINOPHEN 5; 325 MG/1; MG/1
1 TABLET ORAL EVERY 6 HOURS PRN
Qty: 20 TABLET | Refills: 0 | Status: SHIPPED | OUTPATIENT
Start: 2021-08-20 | End: 2021-08-25

## 2021-08-20 RX ORDER — ROCURONIUM BROMIDE 10 MG/ML
INJECTION, SOLUTION INTRAVENOUS PRN
Status: DISCONTINUED | OUTPATIENT
Start: 2021-08-20 | End: 2021-08-20 | Stop reason: SDUPTHER

## 2021-08-20 RX ORDER — MEPERIDINE HYDROCHLORIDE 25 MG/ML
12.5 INJECTION INTRAMUSCULAR; INTRAVENOUS; SUBCUTANEOUS EVERY 5 MIN PRN
Status: DISCONTINUED | OUTPATIENT
Start: 2021-08-20 | End: 2021-08-20 | Stop reason: HOSPADM

## 2021-08-20 RX ORDER — LIDOCAINE HYDROCHLORIDE 20 MG/ML
INJECTION, SOLUTION INFILTRATION; PERINEURAL PRN
Status: DISCONTINUED | OUTPATIENT
Start: 2021-08-20 | End: 2021-08-20 | Stop reason: SDUPTHER

## 2021-08-20 RX ORDER — DIPHENHYDRAMINE HYDROCHLORIDE 50 MG/ML
12.5 INJECTION INTRAMUSCULAR; INTRAVENOUS
Status: DISCONTINUED | OUTPATIENT
Start: 2021-08-20 | End: 2021-08-20 | Stop reason: HOSPADM

## 2021-08-20 RX ORDER — FENTANYL CITRATE 50 UG/ML
50 INJECTION, SOLUTION INTRAMUSCULAR; INTRAVENOUS EVERY 10 MIN PRN
Status: DISCONTINUED | OUTPATIENT
Start: 2021-08-20 | End: 2021-08-20 | Stop reason: HOSPADM

## 2021-08-20 RX ORDER — ONDANSETRON 2 MG/ML
4 INJECTION INTRAMUSCULAR; INTRAVENOUS
Status: COMPLETED | OUTPATIENT
Start: 2021-08-20 | End: 2021-08-20

## 2021-08-20 RX ADMIN — DEXAMETHASONE SODIUM PHOSPHATE 7 MG: 4 INJECTION, SOLUTION INTRAMUSCULAR; INTRAVENOUS at 07:26

## 2021-08-20 RX ADMIN — CEFAZOLIN SODIUM 2000 MG: 10 INJECTION, POWDER, FOR SOLUTION INTRAVENOUS at 07:49

## 2021-08-20 RX ADMIN — PROPOFOL 200 MG: 10 INJECTION, EMULSION INTRAVENOUS at 07:39

## 2021-08-20 RX ADMIN — ROCURONIUM BROMIDE 50 MG: 10 INJECTION INTRAVENOUS at 07:39

## 2021-08-20 RX ADMIN — ONDANSETRON 4 MG: 2 INJECTION INTRAMUSCULAR; INTRAVENOUS at 09:10

## 2021-08-20 RX ADMIN — SODIUM CHLORIDE, POTASSIUM CHLORIDE, SODIUM LACTATE AND CALCIUM CHLORIDE: 600; 310; 30; 20 INJECTION, SOLUTION INTRAVENOUS at 06:40

## 2021-08-20 RX ADMIN — METOCLOPRAMIDE HYDROCHLORIDE 10 MG: 5 INJECTION INTRAMUSCULAR; INTRAVENOUS at 09:21

## 2021-08-20 RX ADMIN — LIDOCAINE HYDROCHLORIDE 40 MG: 20 INJECTION, SOLUTION INFILTRATION; PERINEURAL at 07:39

## 2021-08-20 RX ADMIN — ROPIVACAINE HYDROCHLORIDE 20 ML: 5 INJECTION, SOLUTION EPIDURAL; INFILTRATION; PERINEURAL at 07:26

## 2021-08-20 RX ADMIN — SUGAMMADEX 200 MG: 100 INJECTION, SOLUTION INTRAVENOUS at 08:48

## 2021-08-20 RX ADMIN — MIDAZOLAM HYDROCHLORIDE 4 MG: 1 INJECTION, SOLUTION INTRAMUSCULAR; INTRAVENOUS at 07:20

## 2021-08-20 ASSESSMENT — PULMONARY FUNCTION TESTS
PIF_VALUE: 17
PIF_VALUE: 15
PIF_VALUE: 18
PIF_VALUE: 17
PIF_VALUE: 18
PIF_VALUE: 18
PIF_VALUE: 12
PIF_VALUE: 18
PIF_VALUE: 17
PIF_VALUE: 18
PIF_VALUE: 17
PIF_VALUE: 18
PIF_VALUE: 17
PIF_VALUE: 15
PIF_VALUE: 17
PIF_VALUE: 18
PIF_VALUE: 17
PIF_VALUE: 17
PIF_VALUE: 3
PIF_VALUE: 17
PIF_VALUE: 4
PIF_VALUE: 17
PIF_VALUE: 1
PIF_VALUE: 10
PIF_VALUE: 17
PIF_VALUE: 1
PIF_VALUE: 17
PIF_VALUE: 18
PIF_VALUE: 11
PIF_VALUE: 18
PIF_VALUE: 17
PIF_VALUE: 18
PIF_VALUE: 18
PIF_VALUE: 17
PIF_VALUE: 18
PIF_VALUE: 17
PIF_VALUE: 17
PIF_VALUE: 18
PIF_VALUE: 24
PIF_VALUE: 17
PIF_VALUE: 13
PIF_VALUE: 18
PIF_VALUE: 17
PIF_VALUE: 18
PIF_VALUE: 4
PIF_VALUE: 1
PIF_VALUE: 17
PIF_VALUE: 11
PIF_VALUE: 4
PIF_VALUE: 17
PIF_VALUE: 1
PIF_VALUE: 17
PIF_VALUE: 18
PIF_VALUE: 17

## 2021-08-20 NOTE — ANESTHESIA PRE PROCEDURE
Department of Anesthesiology  Preprocedure Note       Name:  Liliana Peña   Age:  35 y.o.  :  1980                                          MRN:  [id number]         Date:  2021      Surgeon: Kelli Deluna):  Jonathon Caballero MD    Procedure: Procedure(s):  RIGHT ACHILLES TENDON REPAIR, [address], [address] [place], PAT AT [place]    Medications prior to admission:   Prior to Admission medications    Medication Sig Start Date End Date Taking? Authorizing Provider   acetaminophen (TYLENOL) 500 MG tablet Take 1,000 mg by mouth every 6 hours as needed for Pain   Yes Historical Provider, MD   HYDROcodone-acetaminophen (NORCO) 5-325 MG per tablet Take 1 tablet by mouth every 6 hours as needed for Pain (hold until after procedure). Yes Historical Provider, MD       Current medications:    Current Facility-Administered Medications   Medication Dose Route Frequency Provider Last Rate Last Admin    ceFAZolin (ANCEF) 2000 mg in dextrose 5 % 100 mL IVPB  2,000 mg Intravenous On Call to [address] MD           Allergies: Allergies   Allergen Reactions    Codeine        Problem List:    Patient Active Problem List   Diagnosis Code    PCOS (polycystic ovarian syndrome) E28.2    PCO (polycystic ovaries) E28.2    Achilles rupture, right L50.672Z       Past Medical History:        Diagnosis Date    Arthritis     Pap smear for cervical cancer screening 2011    neg.     PCO (polycystic ovaries)     PCOS (polycystic ovarian syndrome)     PONV (postoperative nausea and vomiting)        Past Surgical History:        Procedure Laterality Date    TONSILLECTOMY      TYMPANOSTOMY TUBE PLACEMENT      UTERINE FIBROID SURGERY         Social History:    Social History     Tobacco Use    Smoking status: Current Every Day Smoker     Packs/day: 0.50     Years: 10.00     Pack years: 5.00     Types: Cigarettes    Smokeless tobacco: Never Used   Substance Use Topics    Alcohol use: Yes     Comment: occasionally                                Ready to quit: Not Answered  Counseling given: Not Answered      Vital Signs (Current):   Vitals:    08/20/21 0545   BP: 119/71   Pulse: 79   Resp: 18   Temp: 98.9 °F (37.2 °C)   TempSrc: Temporal   SpO2: 96%   Weight: 200 lb (90.7 kg)   Height: 5' 6\" (1.676 m)                                              BP Readings from Last 3 Encounters:   08/20/21 119/71   08/16/21 110/60   07/31/21 117/82       NPO Status: Time of last liquid consumption: 2300                        Time of last solid consumption: 2030                        Date of last liquid consumption: 08/19/21                        Date of last solid food consumption: 08/19/21    BMI:   Wt Readings from Last 3 Encounters:   08/20/21 200 lb (90.7 kg)   08/16/21 200 lb (90.7 kg)   08/13/21 200 lb (90.7 kg)     Body mass index is 32.28 kg/m². CBC:   Lab Results   Component Value Date    WBC 13.3 08/16/2021    RBC 4.29 08/16/2021    HGB 13.2 08/16/2021    HCT 40.1 08/16/2021    MCV 93.3 08/16/2021    RDW 13.8 08/16/2021     08/16/2021       CMP:   Lab Results   Component Value Date     06/25/2019    K 4.2 06/25/2019     06/25/2019    CO2 20 06/25/2019    BUN 12 06/25/2019    CREATININE 0.67 06/25/2019    GFRAA >60.0 06/25/2019    LABGLOM >60.0 06/25/2019    GLUCOSE 98 06/25/2019    PROT 7.1 06/25/2019    CALCIUM 8.9 06/25/2019    BILITOT 0.3 06/25/2019    ALKPHOS 54 06/25/2019    AST 15 06/25/2019    ALT 8 06/25/2019       POC Tests: No results for input(s): POCGLU, POCNA, POCK, POCCL, POCBUN, POCHEMO, POCHCT in the last 72 hours.     Coags: No results found for: PROTIME, INR, APTT    HCG (If Applicable): No results found for: PREGTESTUR, PREGSERUM, HCG, HCGQUANT     ABGs: No results found for: PHART, PO2ART, SGG0ZEM, IHO7OJD, BEART, U0SWPUOM     Type & Screen (If Applicable):  No results found for: LABABO, LABRH    Drug/Infectious Status (If Applicable):  No results found for: HIV, HEPCAB    COVID-19 Screening (If Applicable):   Lab Results   Component Value Date    COVID19 Not Detected 08/16/2021           Anesthesia Evaluation  Patient summary reviewed and Nursing notes reviewed   history of anesthetic complications: PONV. Airway: Mallampati: II  TM distance: >3 FB   Neck ROM: full  Mouth opening: > = 3 FB Dental: normal exam         Pulmonary:                              Cardiovascular:Negative CV ROS                      Neuro/Psych:   Negative Neuro/Psych ROS              GI/Hepatic/Renal:   (+) morbid obesity          Endo/Other: Negative Endo/Other ROS                    Abdominal:             Vascular: negative vascular ROS. Other Findings:             Anesthesia Plan      general     ASA 2     (Popliteal nerve block)  Induction: intravenous. MIPS: Postoperative opioids intended and Prophylactic antiemetics administered. Anesthetic plan and risks discussed with patient.         Attending anesthesiologist reviewed and agrees with Casimiro Chan MD   8/20/2021

## 2021-08-20 NOTE — PROGRESS NOTES
Pt states nauseated at 0910 medicated with zofran 4mg iv, at 0921 pt stated still nauseated no vomiting, medicated with reglan 10mg iv

## 2021-08-20 NOTE — OP NOTE
Operative Note      Patient: Cece Bettencourt  YOB: 1980  MRN: [id number]    Date of Procedure: 8/20/2021    Pre-Op Diagnosis: RIGHT ACHILLES TENDON RUPTURE    Post-Op Diagnosis: Same       Procedure(s):  RIGHT ACHILLES TENDON REPAIR    Surgeon(s):  Suzi Segal MD    Assistant:   Physician Assistant: Andrew John PA-C    Anesthesia: General    Estimated Blood Loss (mL): Minimal    Complications: None    Specimens:   ID Type Source Tests Collected by Time Destination   A : ACHILLES TENDON CALCIFICATION Tissue Ankle SURGICAL PATHOLOGY Suzi Segal MD 8/20/2021 6661        Implants:  * No implants in log *      Drains: * No LDAs found *    Findings: Midsubstance Achilles rupture with calcification. Detailed Description of Procedure: This is a 36-year of age female who was injured playing softball approximately 3 weeks ago. She felt a pop. She was seen initially by Dr. Aly Herbert MRI was obtained. She was referred. MRI demonstrated Achilles tendon rupture. Risks and benefits of nonoperative and operative treatment reviewed. She went to proceed with an operative approach. We discussed the incision, mobilization peer to recovery. She was informed to risk to include but not limited to rerupture, weakness, infection, wound complications, arthrofibrosis, DVT, pulmonary embolus, neurovascular injury, complex regional pain syndrome, medical anesthetic risk. All questions were answered and consent was obtained. She was brought to the operating where after induction of general anesthesia and popliteal block she was placed prone. All bony prominences were well-padded. Her right lower extremities and prepped and draped in usual sterile fashion. She was given 2 g of cefazolin for prophylactic and biotics. Timeout was taken confirming surgical site, procedure, antibiotics, and fire risk.   Sterile Esmarch was used to exsanguinate the right lower extremity and tourniquet was inflated to 300 mmHg.    15 blade was used to make a longitudinal incision over the posterior aspect of the ankle. Sharp dissection was carried down through skin and subcutaneous tissue. The peritenon was incised. There is evidence in the distal third of the Achilles tendon of significant calcification disruption of the Achilles tendon. The calcification was excised. Healthy Achilles tendon tissue was identified. This was then mobilized. Using #2 Ethibond suture #2 FiberWire grasping running suture as well as the Lutz suture was used to repair the Achilles tendon. Next #0 Vicryl was used to oversew. Excellent fixation was obtained of the Achilles tendon. Day test demonstrated plantarflexion. Copious irrigation was performed with antibiotic saline irrigation. The peritenon was repaired with 2-0 Vicryl. Dermis approximated 2-0 Vicryl. Skin was approximated 4-0 nylon interrupted sutures. Bacitracin Adaptic sterile dressings were applied. Patient was placed in a posterior plantarflexed splint. All sponge and instrument counts were correct at the end of the case.     Electronically signed by Paul Matias MD on 8/20/2021 at 8:43 AM

## 2021-08-20 NOTE — ANESTHESIA PROCEDURE NOTES
Peripheral Block    Patient location during procedure: pre-op  Start time: 8/20/2021 7:20 AM  End time: 8/20/2021 7:26 AM  Staffing  Performed: anesthesiologist   Anesthesiologist: Abilio Cotter MD  Preanesthetic Checklist  Completed: patient identified, IV checked, site marked, risks and benefits discussed, surgical consent, monitors and equipment checked, pre-op evaluation, timeout performed, anesthesia consent given, oxygen available and patient being monitored  Peripheral Block  Patient position: supine  Prep: ChloraPrep  Patient monitoring: cardiac monitor, continuous pulse ox, frequent blood pressure checks and IV access  Block type: Sciatic  Laterality: right  Injection technique: single-shot  Guidance: ultrasound guided and Patient supine with leg elevated. Lateral, IP approach, probe cover employed  Local infiltration: lidocaine  Infiltration strength: 1 %  Dose: 3 mL  Popliteal  Provider prep: mask and sterile gloves  Local infiltration: lidocaine  Needle  Needle type: combined needle/nerve stimulator   Needle gauge: 21 G  Needle length: 10 cm  Needle localization: ultrasound guidance  Assessment  Injection assessment: negative aspiration for heme, no paresthesia on injection and local visualized surrounding nerve on ultrasound  Paresthesia pain: none  Slow fractionated injection: yes  Hemodynamics: stable  Additional Notes  Total of 20cc of 0.5% ropivicaine with 7mg decadron injected.   Reason for block: post-op pain management and at surgeon's request

## 2021-08-20 NOTE — ANESTHESIA POSTPROCEDURE EVALUATION
Department of Anesthesiology  Postprocedure Note    Patient: Blair De Leon  MRN: [id number]  YOB: 1980  Date of evaluation: 8/20/2021  Time:  9:00 AM     Procedure Summary     Date: 08/20/21 Room / Location: [address]    Anesthesia Start: 2063 Anesthesia Stop: 9551    Procedure: RIGHT ACHILLES TENDON REPAIR (Right Ankle) Diagnosis: (RIGHT ACHILLES TENDON RUPTURE)    Surgeons: Cayden Pena MD Responsible Provider: Alma Delia Weaver MD    Anesthesia Type: general ASA Status: 2          Anesthesia Type: general    Parrish Phase I: Parrish Score: 10    Parrish Phase II:      Last vitals: Reviewed and per EMR flowsheets.        Anesthesia Post Evaluation    Patient location during evaluation: PACU  Patient participation: complete - patient participated  Level of consciousness: awake and alert  Airway patency: patent  Nausea & Vomiting: no nausea and no vomiting  Complications: no  Cardiovascular status: blood pressure returned to baseline and hemodynamically stable  Respiratory status: acceptable and nasal cannula  Hydration status: euvolemic

## 2021-08-20 NOTE — PROGRESS NOTES
Patient ID:  Josafat Campbell  [id number]  47 y.o.  1980  BOVIE PAD SITE CLEAR AND INTACT PRE AND POST OP. TAKEN TO PACU,   ATTACHED TO MONITOR AND REPORT GIVEN TO RN.   VSS DRSG DRY AND INTACT  GLASSES AND MASK IN LABELED BAG ON PATIENT CART      Electronically signed by Maryjo Quintero RN on 8/20/2021

## 2021-08-26 ENCOUNTER — TELEPHONE (OUTPATIENT)
Dept: ORTHOPEDIC SURGERY | Age: 41
End: 2021-08-26

## 2021-08-26 NOTE — TELEPHONE ENCOUNTER
Patient calling in stating she had surgery 08/20 with  for a Right achillis repair, patient states she had a fever of 99..00, patient is feeling a pain in her leg. She states she said her pain feels like a muscles cramp. Patient would like to know if she needs an appointment. Please advise.

## 2021-08-27 NOTE — TELEPHONE ENCOUNTER
Spoke to Echo Medina on the phone about her symptoms that are concerning of a blood clot. She has a swollen calf with tenderness as well as a fever. She should go the emergency department for ultrasound. She foot sounds reluctant in doing so, if she does not she is instructed to call our office and I will see her 1230 on Monday.

## 2021-08-28 ENCOUNTER — HOSPITAL ENCOUNTER (EMERGENCY)
Age: 41
Discharge: HOME | End: 2021-08-28
Attending: STUDENT IN AN ORGANIZED HEALTH CARE EDUCATION/TRAINING PROGRAM
Payer: COMMERCIAL

## 2021-08-28 ENCOUNTER — APPOINTMENT (OUTPATIENT)
Dept: ULTRASOUND IMAGING | Age: 41
End: 2021-08-28
Payer: COMMERCIAL

## 2021-08-28 VITALS
HEART RATE: 97 BPM | OXYGEN SATURATION: 97 % | SYSTOLIC BLOOD PRESSURE: 113 MMHG | HEIGHT: 66 IN | DIASTOLIC BLOOD PRESSURE: 69 MMHG | WEIGHT: 220 LBS | TEMPERATURE: 98.2 F | BODY MASS INDEX: 35.36 KG/M2

## 2021-08-28 DIAGNOSIS — I82.431 ACUTE DEEP VEIN THROMBOSIS (DVT) OF POPLITEAL VEIN OF RIGHT LOWER EXTREMITY (HCC): Primary | ICD-10-CM

## 2021-08-28 PROCEDURE — 6370000000 HC RX 637 (ALT 250 FOR IP): Performed by: NURSE PRACTITIONER

## 2021-08-28 PROCEDURE — 93971 EXTREMITY STUDY: CPT

## 2021-08-28 PROCEDURE — 99283 EMERGENCY DEPT VISIT LOW MDM: CPT

## 2021-08-28 RX ADMIN — RIVAROXABAN 15 MG: 15 TABLET, FILM COATED ORAL at 12:07

## 2021-08-28 ASSESSMENT — PAIN DESCRIPTION - PAIN TYPE: TYPE: ACUTE PAIN

## 2021-08-28 ASSESSMENT — ENCOUNTER SYMPTOMS
SORE THROAT: 0
WHEEZING: 0
CHEST TIGHTNESS: 0
ABDOMINAL DISTENTION: 0
ABDOMINAL PAIN: 0
BACK PAIN: 0
RHINORRHEA: 0
COLOR CHANGE: 0
DIARRHEA: 0
COUGH: 0
TROUBLE SWALLOWING: 0
VOMITING: 0
CONSTIPATION: 0
SINUS PRESSURE: 0
SHORTNESS OF BREATH: 0
NAUSEA: 0

## 2021-08-28 ASSESSMENT — PAIN SCALES - GENERAL: PAINLEVEL_OUTOF10: 5

## 2021-08-28 ASSESSMENT — PAIN DESCRIPTION - LOCATION: LOCATION: LEG

## 2021-08-28 NOTE — ED PROVIDER NOTES
[facility] ED  EMERGENCY DEPARTMENT ENCOUNTER      Pt Name: Cheryle Neal  MRN: [id number]  Benitogfmadeline 1980  Date of evaluation: 8/28/2021  Provider: Colleen Hdz       Chief Complaint   Patient presents with    Leg Pain     post op surgery thigh pain feeling like muscular pain that wraps around thigh          HISTORY OF PRESENT ILLNESS   (Location/Symptom, Timing/Onset,Context/Setting, Quality, Duration, Modifying Factors, Severity)  Note limiting factors. Cheryle Neal is a 35 y.o. female who presents to the emergency department for complaint of pain in the back of the knee for the past 2 days. Patient states that 8 days ago she had surgery for an Achilles rupture of her right ankle. She got the brace on since the surgery. She states that she has been ambulating with support but holding her leg up and thought she may have developed a cramp in the back of her leg. She states that it does not hurt her when she is at rest and only hurts when she is upright and shifts any weight to her right leg. She reports that she can move her knee without discomfort denies any pain of the calf or thigh. She contacted her surgeon's office about this and they were concerned that she may need an evaluation for an ultrasound and sent her to the ER for ultrasound of lower extremity to rule out DVT. She denies any redness or swelling of the portion of the calf that is visible above the wrap. She denies any changes of sensation or coloration of her feet she denies any fever chills sweats or other injuries. Currently pain is a 5 out of 10 aching only when she straightens her leg out fully or if she puts any weight on her right leg and only in the back of her knee. Nursing Notes were reviewed.     REVIEW OF SYSTEMS    (2-9 systems for level 4, 10 or more for level 5)     Review of Systems   Constitutional: Negative for activity change, appetite change, chills, diaphoresis, fatigue and fever. HENT: Negative for congestion, ear pain, postnasal drip, rhinorrhea, sinus pressure, sore throat and trouble swallowing. Eyes: Negative for visual disturbance. Respiratory: Negative for cough, chest tightness, shortness of breath and wheezing. Cardiovascular: Negative for chest pain and palpitations. Gastrointestinal: Negative for abdominal distention, abdominal pain, constipation, diarrhea, nausea and vomiting. Genitourinary: Negative for difficulty urinating, dysuria, flank pain, frequency and urgency. Musculoskeletal: Positive for arthralgias. Negative for back pain, gait problem, joint swelling, myalgias, neck pain and neck stiffness. Skin: Negative for color change and rash. Neurological: Negative for dizziness, tremors, seizures, syncope, speech difficulty, weakness, light-headedness, numbness and headaches. Except as noted above the remainder of the review of systems was reviewed and negative. PAST MEDICAL HISTORY     Past Medical History:   Diagnosis Date    Arthritis     Pap smear for cervical cancer screening 01/12/2011    neg.     PCO (polycystic ovaries)     PCOS (polycystic ovarian syndrome)     PONV (postoperative nausea and vomiting)      Past Surgical History:   Procedure Laterality Date    ACHILLES TENDON SURGERY Right 8/20/2021    RIGHT ACHILLES TENDON REPAIR performed by Kylie Adams MD at [address] [place] [address] TYMPANOSTOMY TUBE PLACEMENT      UTERINE FIBROID SURGERY       Social History     Socioeconomic History    Marital status: Single     Spouse name: Not on file    Number of children: Not on file    Years of education: Not on file    Highest education level: Not on file   Occupational History    Not on file   Tobacco Use    Smoking status: Current Every Day Smoker     Packs/day: 0.50     Years: 10.00     Pack years: 5.00     Types: Cigarettes    Smokeless tobacco: Never Used   Vaping Use    Vaping Use: Never used   Substance and Sexual Activity    Alcohol use: Yes     Comment: occasionally    Drug use: No    Sexual activity: Not Currently   Other Topics Concern    Not on file   Social History Narrative    Not on file     Social Determinants of Health     Financial Resource Strain: Low Risk     Difficulty of Paying Living Expenses: Not hard at all   Food Insecurity: No Food Insecurity    Worried About Running Out of Food in the Last Year: Never true    Minnie of Food in the Last Year: Never true   Transportation Needs: No Transportation Needs    Lack of Transportation (Medical): No    Lack of Transportation (Non-Medical): No   Physical Activity:     Days of Exercise per Week:     Minutes of Exercise per Session:    Stress:     Feeling of Stress :    Social Connections:     Frequency of Communication with Friends and Family:     Frequency of Social Gatherings with Friends and Family:     Attends Worship Services:     Active Member of Clubs or Organizations:     Attends Club or Organization Meetings:     Marital Status:    Intimate Partner Violence:     Fear of Current or Ex-Partner:     Emotionally Abused:     Physically Abused:     Sexually Abused:        SCREENINGS    Brian Coma Scale  Eye Opening: Spontaneous  Best Verbal Response: Oriented  Best Motor Response: Obeys commands  [place] Coma Scale Score: 15        PHYSICAL EXAM    (up to 7 for level 4, 8 or more for level 5)     ED Triage Vitals [08/28/21 0912]   BP Temp Temp src Pulse Resp SpO2 Height Weight   119/74 98.2 °F (36.8 °C) -- 97 -- 97 % 5' 6\" (1.676 m) 220 lb (99.8 kg)       Physical Exam  Constitutional:       General: She is not in acute distress. Appearance: Normal appearance. She is normal weight. She is not ill-appearing, toxic-appearing or diaphoretic. HENT:      Head: Normocephalic and atraumatic.       Right Ear: External ear normal.      Left Ear: External ear normal.   Eyes:      General:         Right eye: No discharge. Left eye: No discharge. Conjunctiva/sclera: Conjunctivae normal.      Pupils: Pupils are equal, round, and reactive to light. Cardiovascular:      Rate and Rhythm: Normal rate and regular rhythm. Pulses: Normal pulses. Pulmonary:      Effort: Pulmonary effort is normal.      Breath sounds: Normal breath sounds. Musculoskeletal:         General: No swelling, tenderness, deformity or signs of injury. Normal range of motion. Cervical back: Normal range of motion and neck supple. No rigidity or tenderness. Skin:     General: Skin is warm and dry. Capillary Refill: Capillary refill takes less than 2 seconds. Neurological:      General: No focal deficit present. Mental Status: She is alert and oriented to person, place, and time. Mental status is at baseline. Cranial Nerves: No cranial nerve deficit. Sensory: No sensory deficit. Motor: No weakness. Coordination: Coordination normal.         RESULTS     EKG: All EKG's are interpreted by the Emergency Department Physician who either signs or Co-signsthis chart in the absence of a cardiologist.        RADIOLOGY:   Luann Elyse such as CT, Ultrasound and MRI are read by the radiologist. Plain radiographic images are visualized and preliminarily interpreted by the emergency physician with the below findings:        Interpretation per the Radiologist below, if available at the time ofthis note:    US DUP LOWER EXTREMITY RIGHT ANTOINETTE   Final Result   [address] ED BEDSIDE ULTRASOUND:   Performed by ED Physician - none    LABS:  Labs Reviewed - No data to display    All other labs were within normal range or not returned as of this dictation.     EMERGENCY DEPARTMENT COURSE and DIFFERENTIAL DIAGNOSIS/MDM:   Vitals:    Vitals:    08/28/21 0912 08/28/21 1121   BP: 119/74 113/69   Pulse: 97    Temp: 98.2 °F (36.8 °C)    SpO2: 97%    Weight: 220 lb (99.8 kg)    Height: 5' 6\" (1.676 m)             MDM patient is afebrile nontoxic no acute distress hemodynamically stable. On physical exam there is no erythema tenderness or swelling noted. Ultrasound was performed due to the recent surgical intervention with a finding of acute DVT is noted. Dr. Patric Cardenas at bedside for additional evaluation. He has consulted with Dr Gretchen Torres directed and put on Xarelto for acute DVT and follow-up in his office on Monday. Patient is aware. Patient will follow up as directed return to the ER if any onset of new concerns and worsening condition. Patient verbalized understandable given instruction education. CRITICAL CARE TIME       CONSULTS:  None    PROCEDURES:  Unless otherwise noted below, none     Procedures    FINAL IMPRESSION      1.  Acute deep vein thrombosis (DVT) of popliteal vein of right lower extremity ([address]          DISPOSITION/PLAN   DISPOSITION        PATIENT REFERRED TO:  Trisha Veloz MD  [address]    Schedule an appointment as soon as possible for a visit on 8/30/2021      Sid Rehman MD  [address] [place] [address]  [phone]    Call in 2 days        DISCHARGE MEDICATIONS:  New Prescriptions    RIVAROXABAN (XARELTO) 15 MG TABS TABLET    Take 1 tablet by mouth 2 times daily (with meals) for 21 days          (Please notethat portions of this note were completed with a voice recognition program.  Efforts were made to edit the dictations but occasionally words are mis-transcribed.)    SON Blanchard CNP (electronically signed)  Attending Emergency Physician         SON Blanchard CNP  08/28/21 4505

## 2021-08-31 ENCOUNTER — OFFICE VISIT (OUTPATIENT)
Dept: ORTHOPEDIC SURGERY | Age: 41
End: 2021-08-31
Payer: COMMERCIAL

## 2021-08-31 ENCOUNTER — INITIAL CONSULT (OUTPATIENT)
Dept: INTERVENTIONAL RADIOLOGY/VASCULAR | Age: 41
End: 2021-08-31
Payer: COMMERCIAL

## 2021-08-31 ENCOUNTER — TELEPHONE (OUTPATIENT)
Dept: ORTHOPEDIC SURGERY | Age: 41
End: 2021-08-31

## 2021-08-31 VITALS
DIASTOLIC BLOOD PRESSURE: 68 MMHG | HEIGHT: 66 IN | SYSTOLIC BLOOD PRESSURE: 110 MMHG | BODY MASS INDEX: 35.36 KG/M2 | HEART RATE: 95 BPM | WEIGHT: 220 LBS | RESPIRATION RATE: 12 BRPM

## 2021-08-31 VITALS
OXYGEN SATURATION: 96 % | RESPIRATION RATE: 16 BRPM | WEIGHT: 220 LBS | BODY MASS INDEX: 35.36 KG/M2 | TEMPERATURE: 97.8 F | HEIGHT: 66 IN | HEART RATE: 100 BPM

## 2021-08-31 DIAGNOSIS — Z98.890 STATUS POST ACHILLES TENDON REPAIR: ICD-10-CM

## 2021-08-31 DIAGNOSIS — I82.4Y9 ACUTE DEEP VEIN THROMBOSIS (DVT) OF PROXIMAL VEIN OF LOWER EXTREMITY, UNSPECIFIED LATERALITY (HCC): Primary | ICD-10-CM

## 2021-08-31 DIAGNOSIS — Z79.01 ON ANTICOAGULANT THERAPY: ICD-10-CM

## 2021-08-31 DIAGNOSIS — M79.604 PAIN AND SWELLING OF LOWER EXTREMITY, RIGHT: ICD-10-CM

## 2021-08-31 DIAGNOSIS — M79.89 PAIN AND SWELLING OF LOWER EXTREMITY, RIGHT: ICD-10-CM

## 2021-08-31 DIAGNOSIS — S86.011A RUPTURE OF RIGHT ACHILLES TENDON, INITIAL ENCOUNTER: Primary | ICD-10-CM

## 2021-08-31 PROBLEM — R59.9 ADENOPATHY: Status: ACTIVE | Noted: 2021-08-31

## 2021-08-31 PROBLEM — I82.409 ACUTE DVT (DEEP VENOUS THROMBOSIS) (HCC): Status: ACTIVE | Noted: 2021-08-31

## 2021-08-31 PROCEDURE — 99024 POSTOP FOLLOW-UP VISIT: CPT | Performed by: PHYSICIAN ASSISTANT

## 2021-08-31 PROCEDURE — 99244 OFF/OP CNSLTJ NEW/EST MOD 40: CPT | Performed by: NURSE PRACTITIONER

## 2021-08-31 PROCEDURE — 29405 APPL SHORT LEG CAST: CPT | Performed by: PHYSICIAN ASSISTANT

## 2021-08-31 ASSESSMENT — ENCOUNTER SYMPTOMS
RESPIRATORY NEGATIVE: 1
WHEEZING: 0
GASTROINTESTINAL NEGATIVE: 1
SHORTNESS OF BREATH: 0
EYES NEGATIVE: 1
COUGH: 0

## 2021-08-31 NOTE — PROGRESS NOTES
Yany Zimmerman, a female of 35 y.o. came to the office 8/31/2021. Chief Complaint   Patient presents with    [facility] Care     ER FU - Acute DVT       8/31/2021 HPI: Yany Zimmerman referred by [id number] [facility] ED for evaluation of RLE DVT. Right lower leg with cast, ambulates with crutches. Patient presents with symptoms of: [facility] 8/20 for ruptured achilles tendon. Developed 5 days post pain and edema in popliteal fossa area and anterior right thigh. Presented to ED on 8/28. RLE duplex reports total occlusive DVT from right superficial femoral vein extending through popliteal vein. Cast and sutures due to be removed tomorrow by Ortho. Reports pain to thigh and popliteal fossa areas are not as severe or sensitive. Unsure if remaining pain and lower leg edema is more related to DVT or from her surgery. Last evening edema worsened to right toes. Toes warm to touch and pink in color. Reports slight tingling to toes since edema. Placed on Xarelto in ED 15 mg po bid. This will be managed by her PCP Dr. Casey [facility]. No previous H/O DVT or PE's. NSAIDS: Oxycodone meds with moderate relief. Leg elevation: Daily with moderate relief. Stocking use and dates: Has not done conservative therapy with daily consistent wearing of class two compression stockings for at least 6 weeks. Cannot wear due to cast.    Denies claudication. Denies chest pain. Denies shortness of breath.      Family History   Problem Relation Age of Onset    Substance Abuse Father         alcohol    Cancer Paternal Grandfather         throat cancer    High Blood Pressure Mother     Asthma Mother     High Cholesterol Mother     Cancer Maternal Grandfather         lung       Past Surgical History:   Procedure Laterality Date    ACHILLES TENDON SURGERY Right 8/20/2021    RIGHT ACHILLES TENDON REPAIR performed by Regi Eckert MD at [address] [facility] Past Medical History:   Diagnosis Date    Acute DVT (deep venous thrombosis) ([facility] [address] 8/31/2021    Arthritis     Pap smear for cervical cancer screening 01/12/2011    neg.  PCO (polycystic ovaries)     PCOS (polycystic ovarian syndrome)     PONV (postoperative nausea and vomiting)        Social History     Socioeconomic History    Marital status: Single     Spouse name: None    Number of children: None    Years of education: None    Highest education level: None   Occupational History    None   Tobacco Use    Smoking status: Current Every Day Smoker     Packs/day: 0.50     Years: 10.00     Pack years: 5.00     Types: Cigarettes    Smokeless tobacco: Never Used   Vaping Use    Vaping Use: Never used   Substance and Sexual Activity    Alcohol use: Yes     Comment: occasionally    Drug use: No    Sexual activity: Not Currently   Other Topics Concern    None   Social History Narrative    None     Social Determinants of Health     Financial Resource Strain: Low Risk     Difficulty of Paying Living Expenses: Not hard at all   Food Insecurity: No Food Insecurity    Worried About Running Out of Food in the Last Year: Never true    Minnie of Food in the Last Year: Never true   Transportation Needs: No Transportation Needs    Lack of Transportation (Medical): No    Lack of Transportation (Non-Medical): No   Physical Activity:     Days of Exercise per Week:     Minutes of Exercise per Session:    Stress:     Feeling of Stress :    Social Connections:     Frequency of Communication with Friends and Family:     Frequency of Social Gatherings with Friends and Family:     Attends Judaism Services:     Active Member of Clubs or Organizations:     Attends Club or Organization Meetings:     Marital Status:    Intimate Partner Violence:     Fear of Current or Ex-Partner:     Emotionally Abused:     Physically Abused:     Sexually Abused:         Allergies   Allergen Reactions    Codeine Current Outpatient Medications on File Prior to Visit   Medication Sig Dispense Refill    rivaroxaban (XARELTO) 15 MG TABS tablet Take 1 tablet by mouth 2 times daily (with meals) for 21 days 42 tablet 0    celecoxib (CELEBREX) 100 MG capsule Take 1 capsule by mouth 2 times daily for 15 days 30 capsule 0     No current facility-administered medications on file prior to visit. Review of Systems   Constitutional: Negative. Negative for appetite change, chills, fatigue and fever. HENT: Negative. Eyes: Negative. Respiratory: Negative. Negative for cough, shortness of breath and wheezing. Cardiovascular: Positive for leg swelling (right leg and foot. ). Negative for chest pain and palpitations. Gastrointestinal: Negative. Endocrine: Negative. Genitourinary: Negative. Musculoskeletal:        Right lower leg cast. Using crutches. Moderate aching pain to right leg mid thigh to foot. Skin:        She reports sutures to right posterior distal calf and heel area. Neurological: Positive for numbness (right toes). Negative for dizziness, light-headedness and headaches. Hematological: Negative. Psychiatric/Behavioral: Negative. OBJECTIVE:  /68   Pulse 95   Resp 12   Ht 5' 6\" (1.676 m)   Wt 220 lb (99.8 kg)   BMI 35.51 kg/m²     Physical Exam  Constitutional:       General: She is not in acute distress. Appearance: Normal appearance. She is not ill-appearing. HENT:      Head: Normocephalic and atraumatic. Nose: No congestion. Cardiovascular:      Rate and Rhythm: Normal rate. Heart sounds: Normal heart sounds. Pulmonary:      Effort: Pulmonary effort is normal.   Abdominal:      Palpations: Abdomen is soft. Musculoskeletal:         General: No signs of injury. Cervical back: Normal range of motion. Right lower leg: Edema (toes) present. Left lower leg: No edema. Comments: Cast right lower leg.     Skin:     General: Skin

## 2021-08-31 NOTE — TELEPHONE ENCOUNTER
Patient stopped in off stating her toes are numb, patient states not a pins and needle feeling. patient has appointment with Junito tomorrow 9/1.

## 2021-08-31 NOTE — PROGRESS NOTES
[facility]      Subjective:      Chief Complaint   Patient presents with    Post-Op Check     S/p RIGHT ACHILLES TENDON REPAIR - Right with Edinson Griggs MD on 8/20/2021 Pt states he toes feels asleep, deneis tingling       HPI: Azeb Howard is a 35 y.o. female who also 2 weeks postop Achilles tendon repair. Splint removed today. No concerns of infection. Stitches were removed. Past Medical History:   Diagnosis Date    Acute DVT (deep venous thrombosis) ([address] 8/31/2021    Arthritis     Pap smear for cervical cancer screening 01/12/2011    neg.     PCO (polycystic ovaries)     PCOS (polycystic ovarian syndrome)     PONV (postoperative nausea and vomiting)       Past Surgical History:   Procedure Laterality Date    ACHILLES TENDON SURGERY Right 8/20/2021    RIGHT ACHILLES TENDON REPAIR performed by Edinson Griggs MD at [address] [facility] TYMPANOSTOMY TUBE PLACEMENT      UTERINE FIBROID SURGERY       Social History     Socioeconomic History    Marital status: Single     Spouse name: Not on file    Number of children: Not on file    Years of education: Not on file    Highest education level: Not on file   Occupational History    Not on file   Tobacco Use    Smoking status: Current Every Day Smoker     Packs/day: 0.50     Years: 10.00     Pack years: 5.00     Types: Cigarettes    Smokeless tobacco: Never Used   Vaping Use    Vaping Use: Never used   Substance and Sexual Activity    Alcohol use: Yes     Comment: occasionally    Drug use: No    Sexual activity: Not Currently   Other Topics Concern    Not on file   Social History Narrative    Not on file     Social Determinants of Health     Financial Resource Strain: Low Risk     Difficulty of Paying Living Expenses: Not hard at all   Food Insecurity: No Food Insecurity    Worried About Running Out of Food in the Last Year: Never true    Minnie of Food in the Last Year: Never true Component Value Date    WBC 13.3 (H) 08/16/2021    HGB 13.2 08/16/2021    HCT 40.1 08/16/2021    MCV 93.3 08/16/2021     08/16/2021     No results found for: SEDRATE  No results found for: CRP    Assessment and Plan:      Diagnosis Orders   1. Rupture of right Achilles tendon, initial encounter         2 weeks postop Achilles tendon repair. Splint was removed. Incisions looks great. No signs of infection. We will put her into a cast with plantarflexion for 4 more weeks. We will see her back at that time initiate therapy with the boot with heel inserts. The above plan was discussed in thorough detail with Dr. Frida Elizabeth and the patient. No orders of the defined types were placed in this encounter. No orders of the defined types were placed in this encounter. No follow-ups on file.     Kiara Christensen PA-C  [facility]  [phone]

## 2021-09-07 ASSESSMENT — ENCOUNTER SYMPTOMS
WHEEZING: 0
COLOR CHANGE: 0

## 2021-09-08 ENCOUNTER — OFFICE VISIT (OUTPATIENT)
Dept: INTERVENTIONAL RADIOLOGY/VASCULAR | Age: 41
End: 2021-09-08
Payer: COMMERCIAL

## 2021-09-08 VITALS — BODY MASS INDEX: 35.36 KG/M2 | WEIGHT: 220 LBS | HEIGHT: 66 IN

## 2021-09-08 DIAGNOSIS — M79.89 PAIN AND SWELLING OF LOWER EXTREMITY, RIGHT: ICD-10-CM

## 2021-09-08 DIAGNOSIS — M79.604 PAIN AND SWELLING OF LOWER EXTREMITY, RIGHT: ICD-10-CM

## 2021-09-08 DIAGNOSIS — I82.4Y9 ACUTE DEEP VEIN THROMBOSIS (DVT) OF PROXIMAL VEIN OF LOWER EXTREMITY, UNSPECIFIED LATERALITY (HCC): Primary | ICD-10-CM

## 2021-09-08 DIAGNOSIS — S86.011D RUPTURE OF RIGHT ACHILLES TENDON, SUBSEQUENT ENCOUNTER: ICD-10-CM

## 2021-09-08 PROCEDURE — 99214 OFFICE O/P EST MOD 30 MIN: CPT | Performed by: NURSE PRACTITIONER

## 2021-09-08 ASSESSMENT — ENCOUNTER SYMPTOMS
EYES NEGATIVE: 1
COUGH: 0
RESPIRATORY NEGATIVE: 1
GASTROINTESTINAL NEGATIVE: 1
SHORTNESS OF BREATH: 0
WHEEZING: 0

## 2021-09-08 NOTE — PROGRESS NOTES
Rowan George, a female of 35 y.o. came to the office 9/8/2021. Chief Complaint   Patient presents with    Results     Arterial US results     PROGRESS NOTE:     SUBJECTIVE:     9/8/2021: Rowan George returns for results of repeat RLE duplex US scan to evaluate extent of acute DVT onset 8/28/2021, 1.5 weeks. Surgery 8/20 for ruptured achilles tendon. Presented to ED on 8/28. RLE duplex reports total occlusive DVT from right superficial femoral vein extending through popliteal vein. Developed 5 days post procedure pain and edema in popliteal fossa area and anterior right thigh. Had surgical follow up with Ortho last week. She report that Orthopedics stated to her that performing aRLE DVT thrombectomy could complicate the surgery just done. Therefore, she does not want to proceed with RLE thrombectomy. She reports the RLE edema has decreased considerably since last OV. Mild occasional lower leg pain, only occurs with being up on feet for long period of time. Continues to have right lower leg cast.   Compliant with Xarelto. Denies claudication. Denies chest pain. Denies shortness of breath. 8/31/2021 HPI: Rowan George referred [facility] ED for evaluation of RLE DVT. Right lower leg with cast, ambulates with crutches. Patient presents with symptoms of: [facility] Surgery 8/20 for ruptured achilles tendon. Developed 5 days post pain and edema in popliteal fossa area and anterior right thigh. Presented to ED on 8/28. RLE duplex reports total occlusive DVT from right superficial femoral vein extending through popliteal vein. Cast and sutures due to be removed tomorrow by Ortho. Reports pain to thigh and popliteal fossa areas are not as severe or sensitive. Unsure if remaining pain and lower leg edema is more related to DVT or from her surgery. Last evening edema worsened to right toes. Toes warm to touch and pink in color. Reports slight tingling to toes since edema.    Placed on Xarelto in ED 15 mg po bid. This will be managed by her PCP Dr. Radha Douglass. No previous H/O DVT or PE's. NSAIDS: Oxycodone meds with moderate relief. Leg elevation: Daily with moderate relief. Stocking use and dates: Has not done conservative therapy with daily consistent wearing of class two compression stockings for at least 6 weeks. Cannot wear due to cast.    Denies claudication. Denies chest pain. Denies shortness of breath. Family History   Problem Relation Age of Onset    Substance Abuse Father         alcohol    Cancer Paternal Grandfather         throat cancer    High Blood Pressure Mother     Asthma Mother     High Cholesterol Mother     Cancer Maternal Grandfather         lung       Past Surgical History:   Procedure Laterality Date    ACHILLES TENDON SURGERY Right 8/20/2021    RIGHT ACHILLES TENDON REPAIR performed by Sindi Pineda MD at [address] [facility] TYMPANOSTOMY TUBE PLACEMENT      UTERINE FIBROID SURGERY          Past Medical History:   Diagnosis Date    Acute DVT (deep venous thrombosis) ([facility] [address] 8/31/2021    Arthritis     Pap smear for cervical cancer screening 01/12/2011    neg.     PCO (polycystic ovaries)     PCOS (polycystic ovarian syndrome)     PONV (postoperative nausea and vomiting)        Social History     Socioeconomic History    Marital status: Single     Spouse name: None    Number of children: None    Years of education: None    Highest education level: None   Occupational History    None   Tobacco Use    Smoking status: Current Every Day Smoker     Packs/day: 0.50     Years: 10.00     Pack years: 5.00     Types: Cigarettes    Smokeless tobacco: Never Used   Vaping Use    Vaping Use: Never used   Substance and Sexual Activity    Alcohol use: Yes     Comment: occasionally    Drug use: No    Sexual activity: Not Currently   Other Topics Concern    None   Social History Narrative    None     Social Determinants of Health     Financial Resource Strain: Low Risk     Difficulty of Paying Living Expenses: Not hard at all   Food Insecurity: No Food Insecurity    Worried About Running Out of Food in the Last Year: Never true    Minnie of Food in the Last Year: Never true   Transportation Needs: No Transportation Needs    Lack of Transportation (Medical): No    Lack of Transportation (Non-Medical): No   Physical Activity:     Days of Exercise per Week:     Minutes of Exercise per Session:    Stress:     Feeling of Stress :    Social Connections:     Frequency of Communication with Friends and Family:     Frequency of Social Gatherings with Friends and Family:     Attends Druze Services:     Active Member of Clubs or Organizations:     Attends Club or Organization Meetings:     Marital Status:    Intimate Partner Violence:     Fear of Current or Ex-Partner:     Emotionally Abused:     Physically Abused:     Sexually Abused: Allergies   Allergen Reactions    Codeine        Current Outpatient Medications on File Prior to Visit   Medication Sig Dispense Refill    Elastic Bandages & Supports (MEDICAL COMPRESSION STOCKINGS) MISC 1 each by Does not apply route daily Thigh high 20-30 mmhg compression stockings both legs wear daily during day and off Qhs. Wear as tolerated. Do not wear if they cause increased pain. 1 each 5    rivaroxaban (XARELTO) 15 MG TABS tablet Take 1 tablet by mouth 2 times daily (with meals) for 21 days 42 tablet 0    celecoxib (CELEBREX) 100 MG capsule Take 1 capsule by mouth 2 times daily for 15 days 30 capsule 0     No current facility-administered medications on file prior to visit. Review of Systems   Constitutional: Negative. Negative for appetite change, chills, fatigue and fever. HENT: Negative. Eyes: Negative. Respiratory: Negative. Negative for cough, shortness of breath and wheezing. Cardiovascular: Positive for leg swelling (right lower leg and foot. ).  Negative for chest pain and palpitations. Gastrointestinal: Negative. Endocrine: Negative. Genitourinary: Negative. Musculoskeletal:        Right lower leg cast. Using crutches. Mild intermittent aching pain to right lower leg and foot. Skin: Negative. Negative for color change, rash and wound. Neurological: Negative for dizziness, light-headedness, numbness and headaches. Hematological: Negative. Psychiatric/Behavioral: Negative. OBJECTIVE:  Ht 5' 6\" (1.676 m)   Wt 220 lb (99.8 kg)   BMI 35.51 kg/m²     Physical Exam  Constitutional:       General: She is not in acute distress. Appearance: Normal appearance. She is not ill-appearing. HENT:      Head: Normocephalic and atraumatic. Nose: No congestion. Cardiovascular:      Rate and Rhythm: Normal rate. Heart sounds: Normal heart sounds. Pulmonary:      Effort: Pulmonary effort is normal.   Abdominal:      Palpations: Abdomen is soft. Musculoskeletal:         General: No signs of injury. Cervical back: Normal range of motion. Right lower leg: Edema (toes, mild) present. Left lower leg: No edema. Comments: Cast right lower leg. Skin:     General: Skin is warm and dry. Capillary Refill: Capillary refill takes 2 to 3 seconds. Neurological:      Mental Status: She is alert and oriented to person, place, and time. Psychiatric:         Mood and Affect: Mood normal.         Behavior: Behavior normal.       8/28/2021:  Narrative   EXAMINATION: US DUP LOWER EXTREMITY RIGHT ANTOINETTE       CLINICAL HISTORY: RIGHT-SIDED PAIN.  ACHILLES TENDON REPAIR AUGUST 20, 2021.       COMPARISONS: None available.       FINDINGS:        Compression, augmentation, and color flow demonstrated at level of right common femoral vein.       No compression, no augmentation, and no color flow identified proximal, mid, and distal right superficial femoral vein, and right popliteal vein.       Infrapopliteal venous vasculature cannot be assessed secondary to overlying lower extremity bandages.           Impression   ACUTE DEEP VEIN THROMBOSIS EXTENDING FROM RIGHT SUPERFICIAL FEMORAL VEIN THROUGH RIGHT POPLITEAL VEIN. RLE repeat Duplex scan reviewed and discussed with patient. There is slight improvement with proximal femoral vein now with partial compression, some blood flow, as opposed to total compression in previous US scan done 8/28. Report not available for posting. ASSESSMENT AND PLAN:  Chart, medications reviewed. Diagnosis Orders   1. Acute deep vein thrombosis (DVT) of proximal vein of lower extremity, unspecified laterality ([address]  US DUP LOWER EXTREMITY RIGHT ANTOINETTE   2. Pain and swelling of lower extremity, right  US DUP LOWER EXTREMITY RIGHT ANTOINETTE   3. Rupture of right Achilles tendon, subsequent encounter         Plan:     --  She report that Orthopedics stated to her that performing a thrombectomy could complicate the surgery to RLE just done. Therefore, she does not want to proceed with RLE thrombectomy. --  Return in three months for repeat RLE duplex to evaluate status of DVT. --  Elevate LE's when sitting and/or lying for management of LE edema. --  Patient is advised that if symptoms worsen in any way they will proceed to the nearest emergency room. --  Follow up with general practitioner for other medical concerns and management.      Mynor Rodriguez, SON - CNP

## 2021-09-11 ASSESSMENT — ENCOUNTER SYMPTOMS: COLOR CHANGE: 0

## 2021-09-13 ENCOUNTER — TELEPHONE (OUTPATIENT)
Dept: FAMILY MEDICINE CLINIC | Age: 41
End: 2021-09-13

## 2021-09-13 NOTE — TELEPHONE ENCOUNTER
Orders Placed This Encounter   Medications    rivaroxaban (XARELTO) 20 MG TABS tablet     Sig: Take 1 tablet by mouth daily (with breakfast)     Dispense:  30 tablet     Refill:  5       The above med(s) were e-scripted to the patient's pharmacy.    Please advise patient  Miguel A Guerrero MD

## 2021-09-13 NOTE — TELEPHONE ENCOUNTER
----- Message from Rafiq Lewis sent at 9/13/2021 11:08 AM EDT -----  Subject: Refill Request    QUESTIONS  Name of Medication? rivaroxaban (XARELTO) 15 MG TABS tablet  Patient-reported dosage and instructions? 15mg twice a day  How many days do you have left? 4  Preferred Pharmacy? [id number]  Pharmacy phone number (if available)? [phone]  Additional Information for Provider? Pt was supposed to see pcp today   about this medication to see if she needs to stay on it. She says that the   blood clot is still there. She was unable to keep her 09/13 appt because   of lack of transportation we were unable to reschedule the appt before she   ran out of meds. She would like to be contacted.   ---------------------------------------------------------------------------  --------------  CALL BACK INFO  What is the best way for the office to contact you? OK to leave message on   voicemail, OK to respond with electronic message via idio portal (only   for patients who have registered idio account)  Preferred Call Back Phone Number?  [phone]

## 2021-09-20 ENCOUNTER — VIRTUAL VISIT (OUTPATIENT)
Dept: FAMILY MEDICINE CLINIC | Age: 41
End: 2021-09-20
Payer: COMMERCIAL

## 2021-09-20 DIAGNOSIS — I82.4Y9 ACUTE DEEP VEIN THROMBOSIS (DVT) OF PROXIMAL VEIN OF LOWER EXTREMITY, UNSPECIFIED LATERALITY (HCC): Primary | ICD-10-CM

## 2021-09-20 DIAGNOSIS — S86.011S RUPTURE OF RIGHT ACHILLES TENDON, SEQUELA: ICD-10-CM

## 2021-09-20 PROCEDURE — 99442 PR PHYS/QHP TELEPHONE EVALUATION 11-20 MIN: CPT | Performed by: FAMILY MEDICINE

## 2021-09-20 NOTE — PROGRESS NOTES
2021    TELEHEALTH EVALUATION -- Audio/Visual (During ZWTDW-43 public health emergency)    Due to [place] 19 outbreak, patient's office visit was converted to a virtual visit. Patient was contacted and agreed to proceed with a virtual visit via Telephone Visit  The risks and benefits of converting to a virtual visit were discussed in light of the current infectious disease epidemic. Patient also understood that insurance coverage and co-pays are up to their individual insurance plans. HPI:    Josafat Campbell (:  1980) has requested an audio/video evaluation for the following concern(s):  Chief Complaint   Patient presents with    Discuss Medications     blood thinners       Ruptured Achilles   Surgery   ED   Pain in leg the morning of the     Felt like pulled muscle    DVT found in ER     ACUTE DEEP VEIN THROMBOSIS EXTENDING FROM RIGHT SUPERFICIAL FEMORAL [facility]. Has seen vascular in follow up  No plans for thrombectomy  Had f/u u/s    Leg is still in a cast unfortunately, so having swelling, can't wear compression    Patient Active Problem List   Diagnosis    PCOS (polycystic ovarian syndrome)    PCO (polycystic ovaries)    Achilles rupture, right    Adenopathy    Acute DVT (deep venous thrombosis) ([address]     Past Medical History:   Diagnosis Date    Acute DVT (deep venous thrombosis) ([facility] [address] 2021    Arthritis     Pap smear for cervical cancer screening 2011    neg.  PCO (polycystic ovaries)     PCOS (polycystic ovarian syndrome)     PONV (postoperative nausea and vomiting)          Review of Systems    Otherwise physically feels healthy without sob/palpitations/chest pain/f/c/n/v/weight gain/weight loss/abd pain      Prior to Visit Medications    Medication Sig Taking?  Authorizing Provider   rivaroxaban (XARELTO) 20 MG TABS tablet Take 1 tablet by mouth daily (with breakfast) Yes Jorge Shipley MD   Elastic Bandages & Supports (MEDICAL COMPRESSION STOCKINGS) MISC 1 each by Does not apply route daily Thigh high 20-30 mmhg compression stockings both legs wear daily during day and off Qhs. Wear as tolerated. Do not wear if they cause increased pain. Yes Jd Farfan APRN - CNP       Social History     Tobacco Use    Smoking status: Current Every Day Smoker     Packs/day: 0.50     Years: 10.00     Pack years: 5.00     Types: Cigarettes    Smokeless tobacco: Never Used   Vaping Use    Vaping Use: Never used   Substance Use Topics    Alcohol use: Yes     Comment: occasionally    Drug use: No            PHYSICAL EXAMINATION:  Patient-Reported Vitals 9/20/2021   Patient-Reported Weight 200 lb   Patient-Reported Height 5'2   Patient-Reported Systolic 372   Patient-Reported Diastolic 80   Patient-Reported Temperature -         No exam  Phone visit  Patient in no significant distress, conversant    Due to this being a TeleHealth encounter, evaluation of the following organ systems is limited: Vitals/Constitutional/EENT/Resp/CV/GI//MS/Neuro/Skin/Heme-Lymph-Imm. Lab Results   Component Value Date    WBC 13.3 (H) 08/16/2021    HGB 13.2 08/16/2021    HCT 40.1 08/16/2021     08/16/2021    CHOL 172 06/25/2019    TRIG 67 06/25/2019    HDL 52 06/25/2019    ALT 8 06/25/2019    AST 15 06/25/2019     06/25/2019    K 4.2 06/25/2019     06/25/2019    CREATININE 0.67 06/25/2019    BUN 12 06/25/2019    CO2 20 06/25/2019    TSH 1.430 06/25/2019    LABA1C 5.3 06/25/2019         ASSESSMENT/PLAN:     Diagnosis Orders   1. Acute deep vein thrombosis (DVT) of proximal vein of lower extremity, unspecified laterality ([facility] [address]     2. Rupture of right Achilles tendon, sequela       She will continue xarelto  See vascular in 3 months for repeat u/s   Tylenol for pain   Aleve/advil only on occasion       No follow-ups on file. An  electronic signature was used to authenticate this note.     --Swati Marin MD on 9/20/2021 at 3:05 PM        Pursuant to the emergency declaration under the [facility] waiver authority and the [employer] Act, this Virtual  Visit was conducted, with patient's consent, to reduce the patient's risk of exposure to COVID-19 and provide continuity of care for an established patient.     11 minute call

## 2021-10-08 ENCOUNTER — OFFICE VISIT (OUTPATIENT)
Dept: ORTHOPEDIC SURGERY | Age: 41
End: 2021-10-08

## 2021-10-08 VITALS
WEIGHT: 220 LBS | TEMPERATURE: 98 F | HEART RATE: 84 BPM | OXYGEN SATURATION: 98 % | HEIGHT: 66 IN | BODY MASS INDEX: 35.36 KG/M2

## 2021-10-08 DIAGNOSIS — S86.011A RUPTURE OF RIGHT ACHILLES TENDON, INITIAL ENCOUNTER: Primary | ICD-10-CM

## 2021-10-08 PROCEDURE — 99024 POSTOP FOLLOW-UP VISIT: CPT | Performed by: PHYSICIAN ASSISTANT

## 2021-10-12 ENCOUNTER — HOSPITAL ENCOUNTER (OUTPATIENT)
Dept: PHYSICAL THERAPY | Age: 41
Setting detail: THERAPIES SERIES
Discharge: HOME | End: 2021-10-12
Payer: COMMERCIAL

## 2021-10-12 PROCEDURE — 97110 THERAPEUTIC EXERCISES: CPT

## 2021-10-12 PROCEDURE — 97162 PT EVAL MOD COMPLEX 30 MIN: CPT

## 2021-10-12 ASSESSMENT — PAIN DESCRIPTION - LOCATION: LOCATION: ANKLE

## 2021-10-12 ASSESSMENT — PAIN SCALES - GENERAL: PAINLEVEL_OUTOF10: 1

## 2021-10-12 ASSESSMENT — PAIN DESCRIPTION - ORIENTATION: ORIENTATION: RIGHT

## 2021-10-12 ASSESSMENT — PAIN DESCRIPTION - DESCRIPTORS: DESCRIPTORS: ACHING

## 2021-10-12 NOTE — PROGRESS NOTES
Man[address]     Ph: [phone]  Fax: [phone]    [] Certification  [] Recertification [x]  Plan of Care  [] Progress Note [] Discharge      To:  Starr Silva PA-C      From:  Angelina Chawla, PT  Patient: Celia Gallagher     : 1980  Diagnosis: rupture of R achilles tendon     Date: 10/12/2021  Treatment Diagnosis: decreased sensation R toes, decreased ankle ROM, decreased R LE strength, decreased balance, decreased actiivty tolerance for standing activity, gait deviations, and increased pain R ankle and foot       Progress Report Period from:  10/12/2021  to 10/12/2021    Total # of Visits to Date: 1   No Show: 0    Canceled Appointment: 0     OBJECTIVE:   Short Term Goals - Time Frame for Short term goals: 2 wks    Goals Current/Discharge status  Met   Short term goal 1: The pt will demo improved R ankle AROM >/= 5-10* in order to progress toward normalized gait pattern.   PROM RLE (degrees)  RLE PROM: Exceptions  R Ankle Dorsiflexion 0-20: neutral  R Ankle Plantar Flexion 0-45: 35  R Ankle Forefoot Inversion 0-40: 21  R Ankle Forefoot Eversion 0-20: 10  AROM RLE (degrees)  RLE AROM: Exceptions  R Ankle Dorsiflexion 0-20: neutraol  R Ankle Plantar Flexion 0-45: 20  R Ankle Forefoot Inversion 0-40: 20  R Ankle Forefoot Eversion 0-20: 5   [] yes  [x] no   Short term goal 2: The pt will demo improved R LE strength >/= 4+/5 in order to safely ambulate with decreased pain/limitations  Strength RLE  Strength RLE: Exception  R Hip Flexion: 4+/5  R Hip Extension: 4/5  R Hip ABduction: 4/5  R Knee Flexion: 4-/5  R Knee Extension: 4-/5  R Ankle Dorsiflexion: 3+/5  R Ankle Plantar flexion: 3+/5  R Ankle Inversion: 3+/5  R Ankle Eversion: 3/5   [] yes  [x] no     Long Term Goals - Time Frame for Long term goals : 8 wks  Goals Current/ Discharge status Met   Long term goal 1: Pt will demonstrate indep and compliance with % of the time for self management of pain. Initiated HEP [] yes  [x] no   Long term goal 2: Pt will demonstrate improved score on LEFS >/= 55/80 for improved pt quality of life. LEFS 40/80   [] yes  [x] no   Long term goal 3: The pt will report decreased pain </=1/10 R ankle at worst in order to increase ease with gait. Pain Location: Ankle    Pain Level: 1 (pain range 1-3/10)    Pain Descriptors: Aching [] yes  [x] no     Body structures, Functions, Activity limitations: Decreased functional mobility , Decreased sensation, Increased pain, Decreased ROM, Decreased strength, Decreased balance, Decreased endurance  Assessment: Pt is 35 y.o. female sustained R achilles tendon rupture and s/p surgery. Pt exhibits intermittent decreased sensation R toes, decreased ankle ROM, decreased R LE strength, decreased balance, decreased actiivty tolerance for standing activity, gait deviations, and increased pain R ankle and foot. 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 Prognosis: Good  Discharge Recommendations: Continue to assess pending progress      PT Education: Goals;PT Role;Plan of Care;Home Exercise Program    PLAN: [x] Evaluate and Treat  Frequency/Duration:  Plan  Times per week: 2  Plan weeks: 6-8  Current Treatment Recommendations: Strengthening, Neuromuscular Re-education, Home Exercise Program, ROM, Manual Therapy - Soft Tissue Mobilization, Safety Education & Training, Balance Training, Endurance Training, Aquatics, Patient/Caregiver Education & Training, Functional Mobility Training, Equipment Evaluation, Education, & procurement, Transfer Training, Gait Training, Stair training, Pain Management, Positioning     Precautions: Other position/activity restrictions: per MD note: R boot wear at all times when ambulating for the next 4 weeks.    Per pt report- boot is only allowed off during hygiene, dressing, and in [address] position; pt is allowed to be WBAT in R boot; pt is allowed to wean self off heel lift in boot                  Patient Status:[x] Continue/ Initiate plan of Care    [] Discharge PT. Recommend pt continue with HEP. [] Additional visits requested, Please re-certify for additional visits:          Signature: Electronically signed by Ashwin Padilla PT on 10/12/21 at 1:17 PM EDT      If you have any questions or concerns, please don't hesitate to call. Thank you for your referral.    I have reviewed this plan of care and certify a need for medically necessary rehabilitation services.     Physician Signature:__________________________________________________________  Date:  Please sign and return

## 2021-10-12 NOTE — PROGRESS NOTES
[address]  PHYSICAL THERAPY EVALUATION    Date: 10/12/2021  Patient Name: Ran Almendarez       MRN: [id number]   Account: [de-identified]   : 1980  (35 y.o.)   Gender: female   Referring Practitioner: Darryle Shropshire, PA-C                 Diagnosis: rupture of R achilles tendon  Treatment Diagnosis: decreased sensation R toes, decreased ankle ROM, decreased R LE strength, decreased balance, decreased actiivty tolerance for standing activity, gait deviations, and increased pain R ankle and foot  Additional Pertinent Hx: arthritis, acute DVT, rupture of R achilles tendon        Other position/activity restrictions: per MD note: R boot wear at all times when ambulating for the next 4 weeks. Per pt report- boot is only allowed off during hygiene, dressing, and in [address] position; pt is allowed to be WBAT in R boot; pt is allowed to wean self off heel lift in boot    Past Medical History:  has a past medical history of Acute DVT (deep venous thrombosis) ([address] Arthritis, Pap smear for cervical cancer screening, PCO (polycystic ovaries), PCOS (polycystic ovarian syndrome), and PONV (postoperative nausea and vomiting). Past Surgical History:   has a past surgical history that includes Uterine fibroid surgery; Tympanostomy tube placement; Tonsillectomy; and Achilles tendon surgery (Right, 2021). 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 Pt states she was NWBIng R LE X 3 months. Pt reports cast off on 10/8 and provided pt with R boot.   Pt appropriate    Assessment: Body structures, Functions, Activity limitations: Decreased functional mobility , Decreased sensation, Increased pain, Decreased ROM, Decreased strength, Decreased balance, Decreased endurance  Assessment: Pt is 35 y.o. female sustained R achilles tendon rupture and s/p surgery. Pt exhibits intermittent decreased sensation R toes, decreased ankle ROM, decreased R LE strength, decreased balance, decreased actiivty tolerance for standing activity, gait deviations, and increased pain R ankle and foot. 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 Prognosis: Good  Discharge Recommendations: Continue to assess pending progress     Decision Making: Medium Complexity  History: med- arthritis, acute DVT, rupture of R achilles tendon  Exam: med- LEFS 40/80  Clinical Presentation: evolving- med    Plan  Frequency/Duration:  Plan  Times per week: 2  Plan weeks: 6-8  Current Treatment Recommendations: Strengthening, Neuromuscular Re-education, Home Exercise Program, ROM, Manual Therapy - Soft Tissue Mobilization, Safety Education & Training, Balance Training, Endurance Training, Aquatics, Patient/Caregiver Education & Training, Functional Mobility Training, Equipment Evaluation, Education, & procurement, Transfer Training, Gait Training, Stair training, Pain Management, Positioning    Patient Education  New Education Provided: PT Education: Goals;PT Role;Plan of Care;Home Exercise Program    POST-PAIN     Pain Rating (0-10 pain scale):   3/10  Location and pain description same as pre-treatment unless indicated. Action: [x] NA  [] Call Physician  [] Perform HEP  [] Meds as prescribed    Evaluation and patient rights have been reviewed and patient agrees with plan of care.   Yes  [x]  No  []   Explain:       Natalie Fall Risk Assessment  Risk Factor Scale  Score   History of Falls [x] Yes  [] No 25  0 25   Secondary Diagnosis [] Yes  [x] No 15  0 0   Ambulatory Aid [] Furniture  [x] Crutches/cane/walker  [] None/bedrest/wheelchair/nurse 30  15  0 15   IV/Heparin Lock [] Yes  [x] No 20  0 0   Gait/Transferring [] Impaired  [x] Weak  [] Normal/bedrest/immobile 20  10  0 10   Mental Status [] Forgets limitations  [x] Oriented to own ability 15  0 0      Total:50     Based on the Assessment score: check the appropriate box. []  No intervention needed   Low =   Score of 0-24  []  Use standard prevention interventions Moderate =  Score of 24-44   [] Discuss fall prevention strategies   [] Indicate moderate falls risk on eval  [x]  Use high risk prevention interventions High = Score of 45 and higher   [x] Discuss fall prevention strategies   [x] Provide supervision during treatment time    Goals  Short term goals  Time Frame for Short term goals: 2 wks  Short term goal 1: The pt will demo improved R ankle AROM >/= 5-10* in order to progress toward normalized gait pattern. Short term goal 2: The pt will demo improved R LE strength >/= 4+/5 in order to safely ambulate with decreased pain/limitations  Long term goals  Time Frame for Long term goals : 8 wks  Long term goal 1: Pt will demonstrate indep and compliance with % of the time for self management of pain. Long term goal 2: Pt will demonstrate improved score on LEFS >/= 55/80 for improved pt quality of life. Long term goal 3: The pt will report decreased pain </=1/10 R ankle at worst in order to increase ease with gait.     PT Individual Minutes  Time In: 9630  Time Out: 1115  Minutes: 53  Timed Code Treatment Minutes: 12 Minutes  Procedure Minutes: eval X 41 min     Timed Activity Minutes Units   Ther Ex 12 1     Electronically signed by Good Arnold PT on 10/12/21 at 12:58 PM EDT

## 2021-10-13 ENCOUNTER — TELEPHONE (OUTPATIENT)
Dept: ORTHOPEDIC SURGERY | Age: 41
End: 2021-10-13

## 2021-10-13 NOTE — TELEPHONE ENCOUNTER
Physical Therapy approved at [facility] for 11 visits. Through 10/12/2021 - 01/09/2022.     Authorization # : [id number]

## 2021-10-18 ENCOUNTER — HOSPITAL ENCOUNTER (OUTPATIENT)
Dept: PHYSICAL THERAPY | Age: 41
Setting detail: THERAPIES SERIES
Discharge: HOME | End: 2021-10-18
Payer: COMMERCIAL

## 2021-10-18 PROCEDURE — 97110 THERAPEUTIC EXERCISES: CPT

## 2021-10-18 PROCEDURE — 97140 MANUAL THERAPY 1/> REGIONS: CPT

## 2021-10-18 ASSESSMENT — PAIN DESCRIPTION - ORIENTATION: ORIENTATION: RIGHT

## 2021-10-18 ASSESSMENT — PAIN DESCRIPTION - DESCRIPTORS: DESCRIPTORS: ACHING

## 2021-10-18 ASSESSMENT — PAIN DESCRIPTION - LOCATION: LOCATION: ANKLE

## 2021-10-18 ASSESSMENT — PAIN SCALES - GENERAL: PAINLEVEL_OUTOF10: 1

## 2021-10-20 ENCOUNTER — HOSPITAL ENCOUNTER (OUTPATIENT)
Dept: PHYSICAL THERAPY | Age: 41
Setting detail: THERAPIES SERIES
Discharge: HOME | End: 2021-10-20
Payer: COMMERCIAL

## 2021-10-20 PROCEDURE — 97140 MANUAL THERAPY 1/> REGIONS: CPT

## 2021-10-20 PROCEDURE — 97110 THERAPEUTIC EXERCISES: CPT

## 2021-10-20 ASSESSMENT — PAIN DESCRIPTION - LOCATION: LOCATION: ANKLE

## 2021-10-20 ASSESSMENT — PAIN DESCRIPTION - DESCRIPTORS: DESCRIPTORS: SORE

## 2021-10-20 ASSESSMENT — PAIN SCALES - GENERAL: PAINLEVEL_OUTOF10: 5

## 2021-10-20 NOTE — PROGRESS NOTES
strength, Decreased balance, Decreased endurance  Assessment: Continue to be seen for progression of current exercises, little change in ankle exercises as pt had a lot of soreness from LV. Added SLR x 3 ways to strengthen RLE. Good tolerance to changes no complaint of increased pain. Declined CP will do at home. Treatment Diagnosis: decreased sensation R toes, decreased ankle ROM, decreased R LE strength, decreased balance, decreased actiivty tolerance for standing activity, gait deviations, and increased pain R ankle and foot          Goals:  Short term goals  Time Frame for Short term goals: 2 wks  Short term goal 1: The pt will demo improved R ankle AROM >/= 5-10* in order to progress toward normalized gait pattern. Short term goal 2: The pt will demo improved R LE strength >/= 4+/5 in order to safely ambulate with decreased pain/limitations    Long term goals  Time Frame for Long term goals : 8 wks  Long term goal 1: Pt will demonstrate indep and compliance with % of the time for self management of pain. Long term goal 2: Pt will demonstrate improved score on LEFS >/= 55/80 for improved pt quality of life. Long term goal 3: The pt will report decreased pain </=1/10 R ankle at worst in order to increase ease with gait. Progress toward goals: Progressing towards goals. POST-PAIN       Pain Rating (0-10 pain scale):   2-3/10   Location and pain description same as pre-treatment unless indicated.    Action: [] NA   [x] Perform HEP  [] Meds as prescribed  [] Modalities as prescribed   [] Call Physician     Frequency/Duration:  Plan  Times per week: 2  Plan weeks: 6-8  Current Treatment Recommendations: Strengthening, Neuromuscular Re-education, Home Exercise Program, ROM, Manual Therapy - Soft Tissue Mobilization, Safety Education & Training, Balance Training, Endurance Training, Aquatics, Patient/Caregiver Education & Training, Functional Mobility Training, Equipment Evaluation, Education, & procurement, Transfer Training, Gait Training, Stair training, Pain Management, Positioning     Pt to continue current HEP. See objective section for any therapeutic exercise changes, additions or modifications this date.          PT Individual Minutes  Time In: 1120  Time Out: 5691  Minutes: 31  Timed Code Treatment Minutes: 31 Minutes  Procedure Minutes: 0     Timed Activity Minutes Units   Ther Ex 23 1   Manual  8 1       Signature:  Electronically signed by Johnson Mulligan PTA on 10/20/21 at 11:56 AM EDT

## 2021-10-25 ENCOUNTER — HOSPITAL ENCOUNTER (OUTPATIENT)
Dept: PHYSICAL THERAPY | Age: 41
Setting detail: THERAPIES SERIES
Discharge: HOME | End: 2021-10-25
Payer: COMMERCIAL

## 2021-10-25 PROCEDURE — 97140 MANUAL THERAPY 1/> REGIONS: CPT

## 2021-10-25 PROCEDURE — 97110 THERAPEUTIC EXERCISES: CPT

## 2021-10-25 NOTE — PROGRESS NOTES
[address] [address]  Outpatient Physical Therapy    Treatment Note        Date: 10/25/2021  Patient: Nico Wolf  : 1980  ACCT #: [de-identified]  Referring Practitioner: Belia Galeas PA-C  Diagnosis: rupture of R achilles tendon  Treatment Diagnosis: decreased sensation R toes, decreased ankle ROM, decreased R LE strength, decreased balance, decreased actiivty tolerance for standing activity, gait deviations, and increased pain R ankle and foot    Visit Information:  PT Visit Information  Onset Date: 21  PT Insurance Information: BCBS  Total # of Visits Approved:  (eval only)  Total # of Visits to Date: 3  No Show: 0  Canceled Appointment: 0  Progress Note Counter: 3/11 (11 visits; 10/12/21 to 22)    Subjective: Patient reports she has 1 lift remaining in walking boot. States she is planning to remove it within the next few days. Denies pain since last visit. continues to ambulate with 1 crutch. Comments: RTD 21  HEP Compliance:  [x] Good [] Fair [] Poor [] Reports not doing due to:    Vital Signs  Patient Currently in Pain: Denies   Pain Screening  Patient Currently in Pain: Denies    OBJECTIVE:   Exercises  Exercise 2: AROM IV/EV/DF/PF X 15 ea  Exercise 3: toe spreading X 10  Exercise 4: ankle circles both directions X 15  Exercise 5: towel scrunches X 2 minutes  Exercise 6: marbles 2 minutes  Exercise 7: SLR x 15  Exercise 8: s/l hip abd x 15  Exercise 9: prone hip ext x 15  Exercise 10: alphabet x1  Exercise 15: seated gastroc stretch: 3 x 30sec with strap (gentle)  ROM: [] NT  [] ROM measurements:     AROM RLE (degrees)  R Ankle Dorsiflexion 0-20: 2 degrees  R Ankle Plantar Flexion 0-45: 42 degrees  R Ankle Forefoot Inversion 0-40: 20 degrees  R Ankle Forefoot Eversion 0-20: 17 degrees  Manual:   Manual therapy  PROM: PROM in all planes (DF/PF, IV/EV) x 8 minutes    *Indicates exercise, modality, or manual techniques to be initiated when appropriate    Assessment:    Body structures, Functions, Activity limitations: Decreased functional mobility , Decreased sensation, Increased pain, Decreased ROM, Decreased strength, Decreased balance, Decreased endurance  Assessment: continued current POC progressing exercises for right ankle mobility. Patient demonstrates improved AROM in all planes. Denies pain during session. Declined CP will ice at home as needed. Treatment Diagnosis: decreased sensation R toes, decreased ankle ROM, decreased R LE strength, decreased balance, decreased actiivty tolerance for standing activity, gait deviations, and increased pain R ankle and foot  Goals:  Short term goals  Time Frame for Short term goals: 2 wks  Short term goal 1: The pt will demo improved R ankle AROM >/= 5-10* in order to progress toward normalized gait pattern. Short term goal 2: The pt will demo improved R LE strength >/= 4+/5 in order to safely ambulate with decreased pain/limitations    Long term goals  Time Frame for Long term goals : 8 wks  Long term goal 1: Pt will demonstrate indep and compliance with % of the time for self management of pain. Long term goal 2: Pt will demonstrate improved score on LEFS >/= 55/80 for improved pt quality of life. Long term goal 3: The pt will report decreased pain </=1/10 R ankle at worst in order to increase ease with gait. Progress toward goals: continue towards all     POST-PAIN       Pain Rating (0-10 pain scale):   0/10   Location and pain description same as pre-treatment unless indicated.    Action: [] NA   [] Perform HEP  [] Meds as prescribed  [] Modalities as prescribed   [] Call Physician     Frequency/Duration:  Plan  Times per week: 2  Plan weeks: 6-8  Current Treatment Recommendations: Strengthening, Neuromuscular Re-education, Home Exercise Program, ROM, Manual Therapy - Soft Tissue Mobilization, Safety Education & Training, Balance Training, Endurance Training, Aquatics, Patient/Caregiver Education & Training, Functional Mobility Training, Equipment Evaluation, Education, & procurement, Transfer Training, Gait Training, Stair training, Pain Management, Positioning     Pt to continue current HEP. See objective section for any therapeutic exercise changes, additions or modifications this date.   PT Individual Minutes  Time In: 1120  Time Out: 1200  Minutes: 40  Timed Code Treatment Minutes: 40 Minutes  Procedure Minutes:0  Timed Activity Minutes Units   Ther Ex 32 2   Manual  8 1       Signature:  Electronically signed by Jay Rodriguez PTA on 10/25/21 at 12:58 PM EDT

## 2021-10-27 ENCOUNTER — HOSPITAL ENCOUNTER (OUTPATIENT)
Dept: PHYSICAL THERAPY | Age: 41
Setting detail: THERAPIES SERIES
Discharge: HOME | End: 2021-10-27
Payer: COMMERCIAL

## 2021-10-27 PROCEDURE — 97110 THERAPEUTIC EXERCISES: CPT

## 2021-10-27 NOTE — PROGRESS NOTES
[address] [address]  Outpatient Physical Therapy    Treatment Note        Date: 10/27/2021  Patient: Rachel Witt  : 1980  ACCT #: [de-identified]  Referring Practitioner: Russel Morrison PA-C  Diagnosis: rupture of R achilles tendon  Treatment Diagnosis: decreased sensation R toes, decreased ankle ROM, decreased R LE strength, decreased balance, decreased actiivty tolerance for standing activity, gait deviations, and increased pain R ankle and foot    Visit Information:  PT Visit Information  Onset Date: 21  PT Insurance Information: BCBS  Total # of Visits Approved:  (eval only)  Total # of Visits to Date: 4  No Show: 0  Canceled Appointment: 0  Progress Note Counter:  (11 visits; 10/12/21 to 22)    Subjective: Patient reports she has 1 lift remaining in walking boot- tried to take out the lift yesterday but continues with gastroc strain. Pt to session without crutch.  0/10  Comments: RTD 21  HEP Compliance:  [x] Good [] Fair [] Poor [] Reports not doing due to:    Vital Signs  Patient Currently in Pain: Denies   Pain Screening  Patient Currently in Pain: Denies    OBJECTIVE:   Exercises  Exercise 3: toe spreading X 20  Exercise 4: ankle circles both directions X 20  Exercise 5: towel scrunches and towel IV/EV X 2 minutes  Exercise 10: alphabet x1  Exercise 11: ankle AROM YTB x 10  Exercise 12: arch lifts X 15  Exercise 13: rocker board seated*  Exercise 15: seated gastroc stretch: 3 x 30sec with strap (gentle)  Exercise 16: plantar fascia stretch 30 sec X 3  Exercise 17: PF/anterior tib stretch seated 30 sec X 3  Exercise 20: HEP: plantar fascia stretch, arch lift, TB ankle AROM, anterior tib stretch    Strength: [x] NT  [] MMT completed:    ROM: [x] NT  [] ROM measurements:  *Indicates exercise, modality, or manual techniques to be initiated when appropriate    Assessment:    Body structures, Functions, Activity limitations: Decreased functional mobility , Decreased sensation, Increased pain, Decreased ROM, Decreased strength, Decreased balance, Decreased endurance  Assessment: Continued current POC progressing exercises for right ankle mobility and open chain strength. Patient demonstrates improved AROM in all planes. Denies pain during session. Treatment Diagnosis: decreased sensation R toes, decreased ankle ROM, decreased R LE strength, decreased balance, decreased actiivty tolerance for standing activity, gait deviations, and increased pain R ankle and foot  Prognosis: Good       Goals:  Short term goals  Time Frame for Short term goals: 2 wks  Short term goal 1: The pt will demo improved R ankle AROM >/= 5-10* in order to progress toward normalized gait pattern. Short term goal 2: The pt will demo improved R LE strength >/= 4+/5 in order to safely ambulate with decreased pain/limitations    Long term goals  Time Frame for Long term goals : 8 wks  Long term goal 1: Pt will demonstrate indep and compliance with % of the time for self management of pain. Long term goal 2: Pt will demonstrate improved score on LEFS >/= 55/80 for improved pt quality of life. Long term goal 3: The pt will report decreased pain </=1/10 R ankle at worst in order to increase ease with gait. Progress toward goals: ongoing, progressing strength and ROM    POST-PAIN       Pain Rating (0-10 pain scale):   0/10 \"fatigue only\"  Location and pain description same as pre-treatment unless indicated.    Action: [x] NA   [] Perform HEP  [] Meds as prescribed  [] Modalities as prescribed   [] Call Physician     Frequency/Duration:  Plan  Times per week: 2  Plan weeks: 6-8  Current Treatment Recommendations: Strengthening, Neuromuscular Re-education, Home Exercise Program, ROM, Manual Therapy - Soft Tissue Mobilization, Safety Education & Training, Balance Training, Endurance Training, Aquatics, Patient/Caregiver Education & Training, Functional Mobility Training, Equipment Evaluation, Education, & procurement, Transfer Training, Gait Training, Stair training, Pain Management, Positioning     Pt to continue current HEP. See objective section for any therapeutic exercise changes, additions or modifications this date.     PT Individual Minutes  Time In: 1946  Time Out: 1200  Minutes: 39  Timed Code Treatment Minutes: 39 Minutes     Timed Activity Minutes Units   Ther Ex 39 3     Signature:  Electronically signed by Yuki Booth PT on 10/27/21 at 11:58 AM EDT

## 2021-11-01 ENCOUNTER — HOSPITAL ENCOUNTER (OUTPATIENT)
Dept: PHYSICAL THERAPY | Age: 41
Setting detail: THERAPIES SERIES
Discharge: HOME | End: 2021-11-01
Payer: COMMERCIAL

## 2021-11-01 PROCEDURE — 97110 THERAPEUTIC EXERCISES: CPT

## 2021-11-01 PROCEDURE — 97140 MANUAL THERAPY 1/> REGIONS: CPT

## 2021-11-01 NOTE — PROGRESS NOTES
minutes    Assessment: Body structures, Functions, Activity limitations: Decreased functional mobility , Decreased sensation, Increased pain, Decreased ROM, Decreased strength, Decreased balance, Decreased endurance  Assessment: Addition of seated heel slides, heel/toe raises and rockerboard to progress ankle strength/ROM; no complaint. Incorporated gentle talocrural mobs into manual techniques to further promote DF mobility w/ good ted and relief of ankle stiffness noted. Cont'd to expand on HEP according to current PRE. Treatment Diagnosis: decreased sensation R toes, decreased ankle ROM, decreased R LE strength, decreased balance, decreased actiivty tolerance for standing activity, gait deviations, and increased pain R ankle and foot  Prognosis: Good     Goals:  Short term goals  Time Frame for Short term goals: 2 wks  Short term goal 1: The pt will demo improved R ankle AROM >/= 5-10* in order to progress toward normalized gait pattern. Short term goal 2: The pt will demo improved R LE strength >/= 4+/5 in order to safely ambulate with decreased pain/limitations  Long term goals  Time Frame for Long term goals : 8 wks  Long term goal 1: Pt will demonstrate indep and compliance with % of the time for self management of pain. Long term goal 2: Pt will demonstrate improved score on LEFS >/= 55/80 for improved pt quality of life. Long term goal 3: The pt will report decreased pain </=1/10 R ankle at worst in order to increase ease with gait. Progress toward goals: Rt ankle ROM/strength     POST-PAIN       Pain Rating (0-10 pain scale):   0/10   Location and pain description same as pre-treatment unless indicated.    Action: [x] NA   [] Perform HEP  [] Meds as prescribed  [] Modalities as prescribed   [] Call Physician     Frequency/Duration:  Plan  Times per week: 2  Plan weeks: 6-8  Current Treatment Recommendations: Strengthening, Neuromuscular Re-education, Home Exercise Program, ROM, Manual Therapy - Soft Tissue Mobilization, Safety Education & Training, Balance Training, Endurance Training, Aquatics, Patient/Caregiver Education & Training, Functional Mobility Training, Equipment Evaluation, Education, & procurement, Transfer Training, Gait Training, Stair training, Pain Management, Positioning     Pt to continue current HEP. See objective section for any therapeutic exercise changes, additions or modifications this date.     PT Individual Minutes  Time In: 8194  Time Out: 1200  Minutes: 38  Timed Code Treatment Minutes: 38 Minutes  Procedure Minutes: N/A      Timed Activity Minutes Units   Ther Ex 30 2   Manual  8 1     Signature:  Electronically signed by Keke Lopez PTA on 11/1/21 at 12:20 PM EDT

## 2021-11-03 ENCOUNTER — HOSPITAL ENCOUNTER (OUTPATIENT)
Dept: PHYSICAL THERAPY | Age: 41
Setting detail: THERAPIES SERIES
Discharge: HOME | End: 2021-11-03
Payer: COMMERCIAL

## 2021-11-03 NOTE — PROGRESS NOTES
Therapy                            Cancellation/No-show Note      Date:  11/3/2021  Patient Name:  Laura Rodriguez  :  1980   MRN:  [id number]  Referring Practitioner: Luis Sherman PA-C  Diagnosis: rupture of R achilles tendon    Visit Information:  PT Visit Information  Onset Date: 21  PT Insurance Information: BCBS  Total # of Visits Approved:  (eval only)  Total # of Visits to Date: 5  No Show: 0  Canceled Appointment: 1  Progress Note Counter:  (11 visits; 10/12/21 to 22) Called to cx., sick    For today's appointment patient:  [x]  Cancelled  []  Rescheduled appointment  []  No-show   []  Called pt to remind of next appointment     Reason given by patient:  [x]  Patient ill  []  Conflicting appointment  []  No transportation    []  Conflict with work  []  No reason given  []  Other:      [x] Pt has future appointments scheduled, no follow up needed  [] Pt requests to be on hold.     Reason:   If > 2 weeks please discuss with therapist.  [] Therapist to call pt for follow up     Comments:       Signature: Electronically signed by Olivia Mac PTA on 11/3/21 at 12:06 PM EDT

## 2021-11-08 ENCOUNTER — HOSPITAL ENCOUNTER (OUTPATIENT)
Dept: PHYSICAL THERAPY | Age: 41
Setting detail: THERAPIES SERIES
Discharge: HOME | End: 2021-11-08
Payer: COMMERCIAL

## 2021-11-08 PROCEDURE — 97140 MANUAL THERAPY 1/> REGIONS: CPT

## 2021-11-08 PROCEDURE — 97110 THERAPEUTIC EXERCISES: CPT

## 2021-11-08 NOTE — PROGRESS NOTES
[id number] [address]  Outpatient Physical Therapy    Treatment Note        Date: 2021  Patient: Pattie Galaviz  : 1980  ACCT #: [de-identified]  Referring Practitioner: Beau Pastrana PA-C  Diagnosis: rupture of R achilles tendon  Treatment Diagnosis: decreased sensation R toes, decreased ankle ROM, decreased R LE strength, decreased balance, decreased actiivty tolerance for standing activity, gait deviations, and increased pain R ankle and foot    Visit Information:  PT Visit Information  Onset Date: 21  PT Insurance Information: BCBS  Total # of Visits Approved:  (eval only)  Total # of Visits to Date: 6  No Show: 0  Canceled Appointment: 1  Progress Note Counter:  (11 visits; 10/12/21 to 22)    Subjective: Pt states \"I took out my last heel lift 2-3 days ago. It actually feels pretty good. \" Pt report being able to increase activity at home this weekend w/ good tolerance. Pt notes being able to rest Rt foot down in shower w/ \"less of a pull on the heel. \"  Comments: RTD 21  HEP Compliance:  [x] Good [] Fair [] Poor [] Reports not doing due to:    Vital Signs  Patient Currently in Pain: Denies   Pain Screening  Patient Currently in Pain: Denies    OBJECTIVE:   Exercises  Exercise 1: Heel slides on sliding board (w/ focus on improved DF mobility) 10\"x10  Exercise 2: Step ups F/L x15 ea on 4\" box (in walking boot)  Exercise 5: towel scrunches and towel IV/EV X 2 minutes  Exercise 6: Seated toe raises 5\"x20, HR's off on 2\" step in seated x20  Exercise 11: ankle AROM YTB x 12 ea  Exercise 12: arch lifts X 15  Exercise 13: rocker board seated (DF/PF) x20, 5\" hold ea way  Exercise 15: seated gastroc stretch: 3 x 30sec with strap (gentle)  Exercise 16: plantar fascia stretch 30 sec X 3  Exercise 17: PF/anterior tib stretch seated 30 sec X 3  Exercise 20: HEP: cont current    Strength: [x] NT  [] MMT completed:     ROM: [] NT  [x] ROM measurements:  AROM RLE (degrees)  R Ankle toward goals: Rt ankle ROM/ strength     POST-PAIN       Pain Rating (0-10 pain scale):   0/10   Location and pain description same as pre-treatment unless indicated. Action: [x] NA   [] Perform HEP  [] Meds as prescribed  [] Modalities as prescribed   [] Call Physician     Frequency/Duration:  Plan  Times per week: 2  Plan weeks: 6-8  Current Treatment Recommendations: Strengthening, Neuromuscular Re-education, Home Exercise Program, ROM, Manual Therapy - Soft Tissue Mobilization, Safety Education & Training, Balance Training, Endurance Training, Aquatics, Patient/Caregiver Education & Training, Functional Mobility Training, Equipment Evaluation, Education, & procurement, Transfer Training, Gait Training, Stair training, Pain Management, Positioning     Pt to continue current HEP. See objective section for any therapeutic exercise changes, additions or modifications this date.      PT Individual Minutes  Time In: 6261  Time Out: 0133  Minutes: 38  Timed Code Treatment Minutes: 38 Minutes  Procedure Minutes: N/A      Timed Activity Minutes Units   Ther Ex 30 2   Manual  8 1     Signature:  Electronically signed by Matthew Brothers PTA on 11/8/21 at 12:01 PM EST

## 2021-11-10 ENCOUNTER — HOSPITAL ENCOUNTER (OUTPATIENT)
Dept: PHYSICAL THERAPY | Age: 41
Setting detail: THERAPIES SERIES
Discharge: HOME | End: 2021-11-10
Payer: COMMERCIAL

## 2021-11-10 PROCEDURE — 97140 MANUAL THERAPY 1/> REGIONS: CPT

## 2021-11-10 PROCEDURE — 97110 THERAPEUTIC EXERCISES: CPT

## 2021-11-10 ASSESSMENT — PAIN SCALES - GENERAL: PAINLEVEL_OUTOF10: 0

## 2021-11-10 NOTE — PROGRESS NOTES
[facility] [address]     Ph: [phone]  Fax: [phone]    [] Certification  [] Recertification []  Plan of Care  [x] Progress Note [] Discharge      To:  Luis Sherman PA-C      From:  Marcio Sorensen, PT  Patient: Laura Rodriguez     : 1980  Diagnosis: rupture of R achilles tendon     Date: 11/10/2021  Treatment Diagnosis: decreased sensation R toes, decreased ankle ROM, decreased R LE strength, decreased balance, decreased actiivty tolerance for standing activity, gait deviations, and increased pain R ankle and foot       Progress Report Period from:  11/10/2021  to 11/10/2021    Total # of Visits to Date: 7   No Show: 0    Canceled Appointment: 1     OBJECTIVE:   Short Term Goals - Time Frame for Short term goals: 2 wks    Goals Current/Discharge status  Met   Short term goal 1: The pt will demo improved R ankle AROM >/= 5-10* in order to progress toward normalized gait pattern. AROM RLE (degrees)  R Ankle Dorsiflexion 0-20: 10 degrees (knee extended)  R Ankle Plantar Flexion 0-45: 43  R Ankle Forefoot Inversion 0-40: 25  R Ankle Forefoot Eversion 0-20: 20   [] yes  [x] no   Short term goal 2: The pt will demo improved R LE strength >/= 4+/5 in order to safely ambulate with decreased pain/limitations  Strength RLE  R Ankle Dorsiflexion: 4-/5  R Ankle Plantar flexion: 4-/5  R Ankle Inversion: 4-/5  R Ankle Eversion: 3+/5   [] yes  [x] no     Long Term Goals - Time Frame for Long term goals : 8 wks  Goals Current/ Discharge status Met   Long term goal 1: Pt will demonstrate indep and compliance with % of the time for self management of pain. HEP indep and compliant- progressions ongoing [] yes  [x] no   Long term goal 2: Pt will demonstrate improved score on LEFS >/= 55/80 for improved pt quality of life.  LEFS 47/80   [] yes  [x] no   Long term goal 3: The pt will report decreased pain </=1/10 R Ruy,  It was nice to see you in the clinic this past week.  Your test results are back and I would like to review those results.    The tests included a urine test to look for protein in your urine and the microalbumin/creatinine ratio is in the normal range at 8.6.  This means that there is no evidence for chronic kidney disease.    The metabolic panel showed that your blood sugar was 121.  Your electrolytes, kidney function and liver function were all normal.    The lipids were excellent.  The LDL is the bad cholesterol and when we see that below 70 we are very happy with that.  I would continue with your use of atorvastatin 40 mg daily.    The hemoglobin A1c was excellent at 6.5.  Our goal is to see that below 7.    I hope all goes well with your carpal tunnel surgery this coming week.  As we discussed I would like for you to check your blood pressure daily and after 6-10 readings send me a BiiCode message with those numbers and will make sure that they are in goal.    Also see if you do okay with not using Aleve and using acetaminophen instead for discomfort.  Not using Aleve will help your blood pressure.  Also we discussed putting omeprazole on hold and seeing how you do without that.    It would like to see back in clinic in about 6 months for a follow-up on your blood pressure.    Best regards,Dr. Nolasco ankle at worst in order to increase ease with gait. Pain Level: 0            [x] yes  [x] no     Body structures, Functions, Activity limitations: Decreased functional mobility , Decreased sensation, Increased pain, Decreased ROM, Decreased strength, Decreased balance, Decreased endurance  Assessment: Pt stated going to MD appt on friday. All goals assess for RTD, this date. Pt exhibiting improved AROM and strength in R ankle. Pt exhibiting 7 point improvement on quality of life measurement compared to eval date. Pt continues to exhibit swelling and PT educated pt to continue with use of ice and elevation for edema control- pt stated understanding. Pt states most benefit from manual techniques. Continued PT is indicated to progress toward reaching goals and for safe return to work when appropriate. Prognosis: Good  Discharge Recommendations: Continue to assess pending progress    PLAN: [x]   Frequency/Duration:  Plan  Times per week: 2  Plan weeks: 6-8  Current Treatment Recommendations: Strengthening, Neuromuscular Re-education, Home Exercise Program, ROM, Manual Therapy - Soft Tissue Mobilization, Safety Education & Training, Balance Training, Endurance Training, Aquatics, Patient/Caregiver Education & Training, Functional Mobility Training, Equipment Evaluation, Education, & procurement, Transfer Training, Gait Training, Stair training, Pain Management, Positioning                    Patient Status:[x] Continue/ Initiate plan of Care    [] Discharge PT. Recommend pt continue with HEP. [] Additional visits requested, Please re-certify for additional visits:          Signature: Electronically signed by Jj Mendez PT on 11/10/21 at 12:13 PM EST      If you have any questions or concerns, please don't hesitate to call. Thank you for your referral.    I have reviewed this plan of care and certify a need for medically necessary rehabilitation services.     Physician Signature:__________________________________________________________  Date:  Please sign and return

## 2021-11-10 NOTE — PROGRESS NOTES
[address] [address]  Outpatient Physical Therapy    Treatment Note        Date: 11/10/2021  Patient: Shasta Terry  : 1980  ACCT #: [de-identified]  Referring Practitioner: Rory Day PA-C  Diagnosis: rupture of R achilles tendon  Treatment Diagnosis: decreased sensation R toes, decreased ankle ROM, decreased R LE strength, decreased balance, decreased actiivty tolerance for standing activity, gait deviations, and increased pain R ankle and foot    Visit Information:  PT Visit Information  Onset Date: 21  PT Insurance Information: BCBS  Total # of Visits Approved:  (eval only)  Total # of Visits to Date: 7  No Show: 0  Canceled Appointment: 1  Progress Note Counter:  (11 visits; 10/12/21 to 22)    Subjective: No c/o pain- c/o fatigue only. Pt states going to MD on Friday; return to MD to reassess blood clot R LE in Dec.  Pt self d/c heel lift in boot. Comments: RTD 21  HEP Compliance:  [x] Good [] Fair [] Poor [] Reports not doing due to:    Vital Signs  Patient Currently in Pain: Denies   Pain Screening  Patient Currently in Pain: Denies  Pain Assessment  Pain Level: 0    OBJECTIVE:   Exercises  Exercise 11: ankle AROM YTB x 12 ea  Exercise 17: PF/anterior tib stretch seated 30 sec X 5  Exercise 20: HEP: cont current    Strength: [] NT  [x] MMT completed:  Strength RLE  R Ankle Dorsiflexion: 4-/5  R Ankle Plantar flexion: 4-/5  R Ankle Inversion: 4-/5  R Ankle Eversion: 3+/5    ROM: [] NT  [x] ROM measurements:     AROM RLE (degrees)  R Ankle Dorsiflexion 0-20: 10 degrees (knee extended)  R Ankle Plantar Flexion 0-45: 43  R Ankle Forefoot Inversion 0-40: 25  R Ankle Forefoot Eversion 0-20: 20     Manual:   Manual therapy  Joint mobilization: Gentle talocrural mobs 5 min  PROM: PROM in all planes (DF/PF, IV/EV) x 12 minutes  Other: objective measurements X 7 min; total manual 24 min    Modalities:  Modalities  Other: Declined, will do at home.    *Indicates exercise, modality, NA   [] Perform HEP  [] Meds as prescribed  [] Modalities as prescribed   [] Call Physician     Frequency/Duration:  Plan  Times per week: 2  Plan weeks: 6-8  Current Treatment Recommendations: Strengthening, Neuromuscular Re-education, Home Exercise Program, ROM, Manual Therapy - Soft Tissue Mobilization, Safety Education & Training, Balance Training, Endurance Training, Aquatics, Patient/Caregiver Education & Training, Functional Mobility Training, Equipment Evaluation, Education, & procurement, Transfer Training, Gait Training, Stair training, Pain Management, Positioning     Pt to continue current HEP. See objective section for any therapeutic exercise changes, additions or modifications this date.     PT Individual Minutes  Time In: 9383  Time Out: 1197  Minutes: 38  Timed Code Treatment Minutes: 38 Minutes     Timed Activity Minutes Units   Ther Ex 14 1   Manual  24 2       Signature:  Electronically signed by Juve Multani PT on 11/10/21 at 12:10 PM EST

## 2021-11-12 ENCOUNTER — OFFICE VISIT (OUTPATIENT)
Dept: ORTHOPEDIC SURGERY | Age: 41
End: 2021-11-12
Payer: COMMERCIAL

## 2021-11-12 VITALS
HEIGHT: 66 IN | OXYGEN SATURATION: 98 % | HEART RATE: 86 BPM | BODY MASS INDEX: 35.36 KG/M2 | TEMPERATURE: 98 F | WEIGHT: 220 LBS

## 2021-11-12 DIAGNOSIS — S86.011A RUPTURE OF RIGHT ACHILLES TENDON, INITIAL ENCOUNTER: Primary | ICD-10-CM

## 2021-11-12 PROCEDURE — L1902 AFO ANKLE GAUNTLET PRE OTS: HCPCS | Performed by: PHYSICIAN ASSISTANT

## 2021-11-12 PROCEDURE — 99024 POSTOP FOLLOW-UP VISIT: CPT | Performed by: PHYSICIAN ASSISTANT

## 2021-11-12 NOTE — PROGRESS NOTES
[facility]      Subjective:      Chief Complaint   Patient presents with    Follow-up     pt states her leg has been feeling \"tired\" no pain. HPI: Twin Alarcon is a 35 y.o. female who is close to 3 months after Achilles tendon rupture and repair. She working with therapy. Wearing a boot with insert. Past Medical History:   Diagnosis Date    Acute DVT (deep venous thrombosis) ([address] 8/31/2021    Arthritis     Pap smear for cervical cancer screening 01/12/2011    neg.  PCO (polycystic ovaries)     PCOS (polycystic ovarian syndrome)     PONV (postoperative nausea and vomiting)       Past Surgical History:   Procedure Laterality Date    ACHILLES TENDON SURGERY Right 8/20/2021    RIGHT ACHILLES TENDON REPAIR performed by Meryle Bowl, MD at [facility] TYMPANOSTOMY TUBE PLACEMENT      UTERINE FIBROID SURGERY       Social History     Socioeconomic History    Marital status: Single     Spouse name: Not on file    Number of children: Not on file    Years of education: Not on file    Highest education level: Not on file   Occupational History    Not on file   Tobacco Use    Smoking status: Former Smoker     Packs/day: 0.50     Years: 10.00     Pack years: 5.00     Types: Cigarettes    Smokeless tobacco: Never Used   Vaping Use    Vaping Use: Never used   Substance and Sexual Activity    Alcohol use: Yes     Comment: occasionally    Drug use: No    Sexual activity: Not Currently   Other Topics Concern    Not on file   Social History Narrative    Not on file     Social Determinants of Health     Financial Resource Strain: Low Risk     Difficulty of Paying Living Expenses: Not hard at all   Food Insecurity: No Food Insecurity    Worried About Running Out of Food in the Last Year: Never true    Minnie of Food in the Last Year: Never true   Transportation Needs: No Transportation Needs    Lack of Transportation (Medical):  No    Lack of Transportation (Non-Medical): No   Physical Activity:     Days of Exercise per Week: Not on file    Minutes of Exercise per Session: Not on file   Stress:     Feeling of Stress : Not on file   Social Connections:     Frequency of Communication with Friends and Family: Not on file    Frequency of Social Gatherings with Friends and Family: Not on file    Attends Spiritism Services: Not on file    Active Member of [address] [place] [address] [employer] or Organizations: Not on file    Attends Club or Organization Meetings: Not on file    Marital Status: Not on file   Intimate Partner Violence:     Fear of Current or Ex-Partner: Not on file    Emotionally Abused: Not on file    Physically Abused: Not on file    Sexually Abused: Not on file   Housing Stability:     Unable to Pay for Housing in the Last Year: Not on file    Number of [place] in the Last Year: Not on file    Unstable Housing in the Last Year: Not on file     Family History   Problem Relation Age of Onset    Substance Abuse Father         alcohol    Cancer Paternal Grandfather         throat cancer    High Blood Pressure Mother     Asthma Mother     High Cholesterol Mother     Cancer Maternal Grandfather         lung     Allergies   Allergen Reactions    Codeine      Current Outpatient Medications on File Prior to Visit   Medication Sig Dispense Refill    rivaroxaban (XARELTO) 20 MG TABS tablet Take 1 tablet by mouth daily (with breakfast) 30 tablet 5     No current facility-administered medications on file prior to visit. Objective:   Pulse 86   Temp 98 °F (36.7 °C) (Temporal)   Ht 5' 6\" (1.676 m)   Wt 220 lb (99.8 kg)   SpO2 98%   BMI 35.51 kg/m²       Radiographs and Laboratory Studies:   Previous diagnostic imaging studies were reviewed.        Laboratory Studies:   Lab Results   Component Value Date    WBC 13.3 (H) 08/16/2021    HGB 13.2 08/16/2021    HCT 40.1 08/16/2021    MCV 93.3 08/16/2021     08/16/2021     No results found for: SEDRATE  No results found for: CRP    Assessment and Plan:      Diagnosis Orders   1. Rupture of right Achilles tendon, initial encounter       Close to 3 months after an Achilles tendon repair. Has been working with therapy. Wearing a boot with an insert. Doing well with therapy. She does have some limitation with plantarflexion. She is weaker as per daughter motions on the aspect as well. She is about 20 degrees off where she needs to be. We will continue working with therapy to work on this. We no longer need to use the boot. She is given a lace up ankle brace today to help support her ankle she feels like it could give out on her. To wear this for the next couple weeks but after wises a thick soled shoe around the house will be fine. We will see her back ready for [place] for hopefully 1 last visit     The above plan was discussed in thorough detail with Dr. Asia Childers and the patient. No orders of the defined types were placed in this encounter. No orders of the defined types were placed in this encounter. No follow-ups on file.     Krystyna Mott PA-C  [facility]  [phone]

## 2021-11-15 ENCOUNTER — HOSPITAL ENCOUNTER (OUTPATIENT)
Dept: PHYSICAL THERAPY | Age: 41
Setting detail: THERAPIES SERIES
Discharge: HOME | End: 2021-11-15
Payer: COMMERCIAL

## 2021-11-15 PROCEDURE — 97110 THERAPEUTIC EXERCISES: CPT

## 2021-11-15 PROCEDURE — 97140 MANUAL THERAPY 1/> REGIONS: CPT

## 2021-11-15 NOTE — PROGRESS NOTES
[address] [address]  Outpatient Physical Therapy    Treatment Note        Date: 11/15/2021  Patient: Ran Almendarez  : 1980  ACCT #: [de-identified]  Referring Practitioner: Darryle Shropshire, PA-C  Diagnosis: rupture of R achilles tendon  Treatment Diagnosis: decreased sensation R toes, decreased ankle ROM, decreased R LE strength, decreased balance, decreased actiivty tolerance for standing activity, gait deviations, and increased pain R ankle and foot    Visit Information:  PT Visit Information  Onset Date: 21  PT Insurance Information: BCBS  Total # of Visits Approved:  (eval only)  Total # of Visits to Date: 8  No Show: 0  Canceled Appointment: 1  Progress Note Counter:  (11 visits; 10/12/21 to 22)    Subjective: Pt presents to appt in lace up brace vs walking boot today following  FU stating \"No pain, just sorness from me using my leg differently and putting more weight down. \" Pt has F/U appt scheduled for 21.   Comments: RTD 21  HEP Compliance:  [x] Good [] Fair [] Poor [] Reports not doing due to:    Vital Signs  Patient Currently in Pain: Denies   Pain Screening  Patient Currently in Pain: Denies    OBJECTIVE:   Exercises  Exercise 2: Step ups F/L x15 ea on 4\" box (brace donned) ), 4\" step downs x10  Exercise 3: SciFit L1 5 min (LE's only)  Exercise 4: marbles (pt completes in 2 min 22 sec)  Exercise 5: towel scrunches and towel IV/EV X 2 minutes  Exercise 6: Seated toe raises 5\"x20, HR's off on 2\" step in seated x20  Exercise 11: ankle AROM YTB x 12 ea  Exercise 12: arch lifts X 15  Exercise 13: rocker board seated (DF/PF) x20, 5\" hold ea way  Exercise 15: standing gastroc stretch: 3 x 30sec, soleuos w/ wedge 3x30\"  Exercise 16: plantar fascia stretch 30 sec X 3  Exercise 17: PF/anterior tib stretch seated 30 sec X 3  Exercise 20: HEP: cont current     Strength: [x] NT  [] MMT completed:     ROM: [] NT  [x] ROM measurements:  AROM RLE (degrees)  R Ankle Dorsiflexion 0-20: 12 degrees (knee extended)   Manual:   Manual therapy  Joint mobilization: Gentle talocrural mobs  PROM: PROM in all planes (DF/PF, IV/EV)  Other: 8 min    Modalities:  Modalities  Other: Declined, will do at home. *Indicates exercise, modality, or manual techniques to be initiated when appropriate    Assessment: Body structures, Functions, Activity limitations: Decreased functional mobility , Decreased sensation, Increased pain, Decreased ROM, Decreased strength, Decreased balance, Decreased endurance  Assessment: Initiated SciFit recumbant bike to promote Rt LE strengthening and improve on Rt LE WB tolerance w/o complaint. Encouraged reduced yair during gait to normalize heel/toe pattern in ankle brace w/ good carryover. DF AROM continuing to improve ea visit w/ pain/soreness alleviated post ROM and gentle exercise activity. Treatment Diagnosis: decreased sensation R toes, decreased ankle ROM, decreased R LE strength, decreased balance, decreased actiivty tolerance for standing activity, gait deviations, and increased pain R ankle and foot  Prognosis: Good     Goals:  Short term goals  Time Frame for Short term goals: 2 wks  Short term goal 1: The pt will demo improved R ankle AROM >/= 5-10* in order to progress toward normalized gait pattern. Short term goal 2: The pt will demo improved R LE strength >/= 4+/5 in order to safely ambulate with decreased pain/limitations  Long term goals  Time Frame for Long term goals : 8 wks  Long term goal 1: Pt will demonstrate indep and compliance with % of the time for self management of pain. Long term goal 2: Pt will demonstrate improved score on LEFS >/= 55/80 for improved pt quality of life. Long term goal 3: The pt will report decreased pain </=1/10 R ankle at worst in order to increase ease with gait.   Progress toward goals: Rt ankle ROM,strength     POST-PAIN       Pain Rating (0-10 pain scale):   0/10   Location and pain description same as pre-treatment unless indicated. Action: [x] NA   [] Perform HEP  [] Meds as prescribed  [] Modalities as prescribed   [] Call Physician     Frequency/Duration:  Plan  Times per week: 2  Plan weeks: 6-8  Current Treatment Recommendations: Strengthening, Neuromuscular Re-education, Home Exercise Program, ROM, Manual Therapy - Soft Tissue Mobilization, Safety Education & Training, Balance Training, Endurance Training, Aquatics, Patient/Caregiver Education & Training, Functional Mobility Training, Equipment Evaluation, Education, & procurement, Transfer Training, Gait Training, Stair training, Pain Management, Positioning     Pt to continue current HEP. See objective section for any therapeutic exercise changes, additions or modifications this date.      PT Individual Minutes  Time In: 0219  Time Out: 1200  Minutes: 38  Timed Code Treatment Minutes: 38 Minutes  Procedure Minutes: N/A      Timed Activity Minutes Units   Ther Ex 30 2   Manual  8 1     Signature:  Electronically signed by Shellie Brooks PTA on 11/15/21 at 12:08 PM EST

## 2021-11-19 ENCOUNTER — HOSPITAL ENCOUNTER (OUTPATIENT)
Dept: PHYSICAL THERAPY | Age: 41
Setting detail: THERAPIES SERIES
Discharge: HOME | End: 2021-11-19
Payer: COMMERCIAL

## 2021-11-19 PROCEDURE — 97110 THERAPEUTIC EXERCISES: CPT

## 2021-11-19 PROCEDURE — 97140 MANUAL THERAPY 1/> REGIONS: CPT

## 2021-11-19 NOTE — PROGRESS NOTES
or manual techniques to be initiated when appropriate    Assessment: Body structures, Functions, Activity limitations: Decreased functional mobility , Decreased sensation, Increased pain, Decreased ROM, Decreased strength, Decreased balance, Decreased endurance  Assessment: Initiated BAPS Board to facilitate improves ankle strength and stability. Better heel toe pattern noted today. Demonstrates good knowledge of exercises. Incresaaed resistance on scifit to improve endurance. Continue to progress current exercises. Treatment Diagnosis: decreased sensation R toes, decreased ankle ROM, decreased R LE strength, decreased balance, decreased actiivty tolerance for standing activity, gait deviations, and increased pain R ankle and foot          Goals:  Short term goals  Time Frame for Short term goals: 2 wks  Short term goal 1: The pt will demo improved R ankle AROM >/= 5-10* in order to progress toward normalized gait pattern. Short term goal 2: The pt will demo improved R LE strength >/= 4+/5 in order to safely ambulate with decreased pain/limitations    Long term goals  Time Frame for Long term goals : 8 wks  Long term goal 1: Pt will demonstrate indep and compliance with % of the time for self management of pain. Long term goal 2: Pt will demonstrate improved score on LEFS >/= 55/80 for improved pt quality of life. Long term goal 3: The pt will report decreased pain </=1/10 R ankle at worst in order to increase ease with gait. Progress toward goals: Progressing towards goals    POST-PAIN       Pain Rating (0-10 pain scale):   0/10   Location and pain description same as pre-treatment unless indicated.    Action: [] NA   [x] Perform HEP  [] Meds as prescribed  [] Modalities as prescribed   [] Call Physician     Frequency/Duration:  Plan  Times per week: 2  Plan weeks: 6-8  Current Treatment Recommendations: Strengthening, Neuromuscular Re-education, Home Exercise Program, ROM, Manual Therapy - Soft Tissue Mobilization, Safety Education & Training, Balance Training, Endurance Training, Aquatics, Patient/Caregiver Education & Training, Functional Mobility Training, Equipment Evaluation, Education, & procurement, Transfer Training, Gait Training, Stair training, Pain Management, Positioning     Pt to continue current HEP. See objective section for any therapeutic exercise changes, additions or modifications this date.          PT Individual Minutes  Time In: 1979  Time Out: 3902  Minutes: 43  Timed Code Treatment Minutes: 43 Minutes  Procedure Minutes: 0     Timed Activity Minutes Units   Ther Ex 35 2   Manual  8 1       Signature:  Electronically signed by Yohannes Love PTA on 11/19/21 at 2:03 PM EST

## 2021-11-23 ENCOUNTER — HOSPITAL ENCOUNTER (OUTPATIENT)
Dept: PHYSICAL THERAPY | Age: 41
Setting detail: THERAPIES SERIES
Discharge: HOME | End: 2021-11-23
Payer: COMMERCIAL

## 2021-11-23 PROCEDURE — 97110 THERAPEUTIC EXERCISES: CPT

## 2021-11-23 PROCEDURE — 97140 MANUAL THERAPY 1/> REGIONS: CPT

## 2021-11-23 NOTE — PROGRESS NOTES
[id number] [address]  Outpatient Physical Therapy    Treatment Note        Date: 2021  Patient: Radha Bardales  : 1980  ACCT #: [de-identified]  Referring Practitioner: Saman Robertson PA-C  Diagnosis: rupture of R achilles tendon  Treatment Diagnosis: decreased sensation R toes, decreased ankle ROM, decreased R LE strength, decreased balance, decreased actiivty tolerance for standing activity, gait deviations, and increased pain R ankle and foot    Visit Information:  PT Visit Information  Onset Date: 21  PT Insurance Information: BCBS  Total # of Visits Approved:  (eval only)  Total # of Visits to Date: 10  No Show: 0  Canceled Appointment: 1  Progress Note Counter: 10/11 (11 visits; 10/12/21 to 22)    Subjective: Pt reports no pain in ankle but right hip & Knee 4/10 soreness. Comments: RTD 21  HEP Compliance:  [x] Good [] Fair [] Poor [] Reports not doing due to:    Vital Signs  Patient Currently in Pain: Denies   Pain Screening  Patient Currently in Pain: Denies    OBJECTIVE:   Exercises  Exercise 2: Step ups F/L x20 ea on 4\" box (brace donned) ), 4\" step downs x20  Exercise 3: SciFit 2.05  5 min (LE's only)  Exercise 6: Seated toe raises 5\"x30, HR's off on 2\" step in seated x30  Exercise 7: SLR x 20  Exercise 8: s/l hip abd x 20  Exercise 9: prone hip ext x 20  Exercise 11: ankle AROM YTB x 20 ea  Exercise 14: BAPS board L1 x 20  Exercise 15: standing gastroc stretch: 3 x 30sec, soleuos w/ wedge 3x30\"       Strength: [x] NT  [] MMT completed:        ROM: [x] NT  [] ROM measurements:      Manual:   Manual therapy  Joint mobilization: Gentle talocrural mobs  PROM: PROM in all planes (DF/PF, IV/EV)  Other: 8 min    *Indicates exercise, modality, or manual techniques to be initiated when appropriate    Assessment:         Body structures, Functions, Activity limitations: Decreased functional mobility , Decreased sensation, Increased pain, Decreased ROM, Decreased strength, Decreased balance, Decreased endurance  Assessment: Continue to be seen for progression of current exercises to facilitate improves ankle strength and stability. States ankle feels more mobile with stepdowns. Demonstrates good knowledge of exercises. Increased resistance on scifit to improve endurance. Continue to progress current exercises. Treatment Diagnosis: decreased sensation R toes, decreased ankle ROM, decreased R LE strength, decreased balance, decreased actiivty tolerance for standing activity, gait deviations, and increased pain R ankle and foot          Goals:  Short term goals  Time Frame for Short term goals: 2 wks  Short term goal 1: The pt will demo improved R ankle AROM >/= 5-10* in order to progress toward normalized gait pattern. Short term goal 2: The pt will demo improved R LE strength >/= 4+/5 in order to safely ambulate with decreased pain/limitations    Long term goals  Time Frame for Long term goals : 8 wks  Long term goal 1: Pt will demonstrate indep and compliance with % of the time for self management of pain. Long term goal 2: Pt will demonstrate improved score on LEFS >/= 55/80 for improved pt quality of life. Long term goal 3: The pt will report decreased pain </=1/10 R ankle at worst in order to increase ease with gait. Progress toward goals: Progressing towards goals. POST-PAIN       Pain Rating (0-10 pain scale):  0 /10   Location and pain description same as pre-treatment unless indicated.    Action: [] NA   [x] Perform HEP  [] Meds as prescribed  [] Modalities as prescribed   [] Call Physician     Frequency/Duration:  Plan  Times per week: 2  Plan weeks: 6-8  Current Treatment Recommendations: Strengthening, Neuromuscular Re-education, Home Exercise Program, ROM, Manual Therapy - Soft Tissue Mobilization, Safety Education & Training, Balance Training, Endurance Training, Aquatics, Patient/Caregiver Education & Training, Functional Mobility Training, Equipment Evaluation, Education, & procurement, Transfer Training, Gait Training, Stair training, Pain Management, Positioning     Pt to continue current HEP. See objective section for any therapeutic exercise changes, additions or modifications this date.          PT Individual Minutes  Time In: 4991  Time Out: 1500  Minutes: 38  Timed Code Treatment Minutes: 38 Minutes  Procedure Minutes: 0     Timed Activity Minutes Units   Ther Ex 30 2   Manual  8 1       Signature:  Electronically signed by Jewel Mehta PTA on 11/23/21 at 3:06 PM EST

## 2021-11-26 ENCOUNTER — HOSPITAL ENCOUNTER (OUTPATIENT)
Dept: PHYSICAL THERAPY | Age: 41
Setting detail: THERAPIES SERIES
Discharge: HOME | End: 2021-11-26
Payer: COMMERCIAL

## 2021-11-26 PROCEDURE — 97110 THERAPEUTIC EXERCISES: CPT

## 2021-11-26 PROCEDURE — 97140 MANUAL THERAPY 1/> REGIONS: CPT

## 2021-11-26 NOTE — PROGRESS NOTES
[address]     Ph: [phone]  Fax: [phone]    [] Certification  [] Recertification []  Plan of Care  [] Progress Note [x] Discharge      To:  Starr Silva PA-C      From: Angelina Chawla , PT  Patient: Celia Gallagher     : 1980  Diagnosis: rupture of R achilles tendon     Date: 2021  Treatment Diagnosis: decreased sensation R toes, decreased ankle ROM, decreased R LE strength, decreased balance, decreased actiivty tolerance for standing activity, gait deviations, and increased pain R ankle and foot     Progress Report Period from:  10/12/2021  to 2021    Total # of Visits to Date: 11   No Show: 0    Canceled Appointment: 1     OBJECTIVE:   Short Term Goals - Time Frame for Short term goals: 2 wks    Goals Current/Discharge status  Met   Short term goal 1: The pt will demo improved R ankle AROM >/= 5-10* in order to progress toward normalized gait pattern. AROM RLE (degrees)  R Ankle Dorsiflexion 0-20: 14 degrees (knee extended)  R Ankle Plantar Flexion 0-45: 50  R Ankle Forefoot Inversion 0-40: 36  R Ankle Forefoot Eversion 0-20: 40 [x] yes  [x] no   Short term goal 2: The pt will demo improved R LE strength >/= 4+/5 in order to safely ambulate with decreased pain/limitations  Strength RLE  R Hip Flexion: 5/5  R Hip Extension: 5/5  R Hip ABduction: 5/5  R Knee Flexion: 5/5  R Knee Extension: 5/5  R Ankle Dorsiflexion: 4+/5  R Ankle Plantar flexion: 4/5  R Ankle Inversion: 5/5  R Ankle Eversion: 4+/5   [x] yes  [x] no     Long Term Goals - Time Frame for Long term goals : 8 wks  Goals Current/ Discharge status Met   Long term goal 1: Pt will demonstrate indep and compliance with % of the time for self management of pain. Indep/complaint  [x] yes  [] no   Long term goal 2: Pt will demonstrate improved score on LEFS >/= 55/80 for improved pt quality of life.  48/80  [] yes  [x] no   Long term goal 3: The pt will report decreased pain </=1/10 R ankle at worst in order to increase ease with gait. 0/10 pain at arrival; maintains <1/10 within last week per pt report [x] yes  [] no     Body structures, Functions, Activity limitations: Decreased functional mobility , Decreased sensation, Increased pain, Decreased ROM, Decreased strength, Decreased balance, Decreased endurance  Assessment: Pt reaching end of POC this date noting good improvements in Rt LE strength and Rt ankle ROM since eval. Pt assessed on various functional abilities noting good ted to recip stair climbing and SLS w/ x1 UE support. Rt LE observed to perform 75% of Lt LE during B HR's. Reviewed/provided progress HEP placing focus on SL strength/stability; good understanding. Pt has been functioning w/o Rt lace up ankle brace ~ 1 wk noting good tolerance or increases in pain. Pt would like to trail D/C from  PT at this time w/ MD F/U scheduled for 12/16/21. Return to work pending MD clearance. PLAN:   Frequency/Duration:  Plan  Plan Comment: D/C this date               Patient Status:[] Continue/ Initiate plan of Care    [x] Discharge PT. Recommend pt continue with HEP. [] Additional visits requested, Please re-certify for additional visits:        Objective measurements provided by:  Signature: Electronically signed by Shellie Brooks PTA on 11/26/21 at 4:02 PM EST  Electronically signed by Marcus Early PT on 12/1/2021 at 1:33 PM    If you have any questions or concerns, please don't hesitate to call. Thank you for your referral.    I have reviewed this plan of care and certify a need for medically necessary rehabilitation services.     Physician Signature:__________________________________________________________  Date:  Please sign and return

## 2021-11-26 NOTE — PROGRESS NOTES
[address] [address]  Outpatient Physical Therapy    Treatment Note        Date: 2021  Patient: Meghan Scruggs  : 1980  ACCT #: [de-identified]  Referring Practitioner: Shanice Oscar PA-C  Diagnosis: rupture of R achilles tendon  Treatment Diagnosis: decreased sensation R toes, decreased ankle ROM, decreased R LE strength, decreased balance, decreased actiivty tolerance for standing activity, gait deviations, and increased pain R ankle and foot    Visit Information:  PT Visit Information  Onset Date: 21  PT Insurance Information: BCBS  Total # of Visits Approved:  (eval only)  Total # of Visits to Date: 11  No Show: 0  Canceled Appointment: 1  Progress Note Counter:  (11 visits; 10/12/21 to 22)    Subjective: Pts primary complaint is Rt knee \"locking up\" and continued difficulty w/ achieving ankle PF ROM. Pt has US scheduled for 21 to R/O DVT.   Comments: RTD 21  HEP Compliance:  [x] Good [] Fair [] Poor [] Reports not doing due to:    Vital Signs  Patient Currently in Pain: Denies   Pain Screening  Patient Currently in Pain: Denies    OBJECTIVE:   Exercises  Exercise 2: Objective measures MMT/ROM 10 min  Exercise 3: SciFit 2.05  5 min (LE's only)  Exercise 4: Rt SLS 30\"x2 x1 finger touch support  Exercise 5: B HR's x10  Exercise 6: Ankle DF strech/self mob on steps 10\"x10  Exercise 7: 4 way TB w/ YTB (reviewed for HEP)  Exercise 15: standing gastroc stretch: 3 x 30sec, soleuos w/ wedge 3x30\"  Exercise 20: HEP: DF stretch on step, SLS w/ and w/o clock reaches, TB 4 way hip    Strength: [] NT  [x] MMT completed:  Strength RLE  R Hip Flexion: 5/5  R Hip Extension: 5/5  R Hip ABduction: 5/5  R Knee Flexion: 5/5  R Knee Extension: 5/5  R Ankle Dorsiflexion: 4+/5  R Ankle Plantar flexion: 4/5  R Ankle Inversion: 5/5  R Ankle Eversion: 4+/5     ROM: [] NT  [x] ROM measurements:  AROM RLE (degrees)  R Ankle Dorsiflexion 0-20: 14 degrees (knee extended)  R Ankle Plantar Flexion 0-45: 50  R Ankle Forefoot Inversion 0-40: 36  R Ankle Forefoot Eversion 0-20: 40     Assessment: Body structures, Functions, Activity limitations: Decreased functional mobility , Decreased sensation, Increased pain, Decreased ROM, Decreased strength, Decreased balance, Decreased endurance  Assessment: Pt reaching end of POC this date noting good improvements in Rt LE strength and Rt ankle ROM since eval. Pt assessed on various functional abilities noting good ted to recip stair climbing and SLS w/ x1 UE support. Rt LE observed to perform 75% of Lt LE during B HR's. Reviewed/provided progress HEP placing focus on SL strength/stability; good understanding. Pt has been functioning w/o Rt lace up ankle brace ~ 1 wk noting good tolerance or increases in pain. Pt would like to trail D/C from  PT at this time w/ MD F/U scheduled for 12/16/21. Treatment Diagnosis: decreased sensation R toes, decreased ankle ROM, decreased R LE strength, decreased balance, decreased actiivty tolerance for standing activity, gait deviations, and increased pain R ankle and foot      Goals:  Short term goals  Time Frame for Short term goals: 2 wks  Short term goal 1: The pt will demo improved R ankle AROM >/= 5-10* in order to progress toward normalized gait pattern. Short term goal 2: The pt will demo improved R LE strength >/= 4+/5 in order to safely ambulate with decreased pain/limitations  Long term goals  Time Frame for Long term goals : 8 wks  Long term goal 1: Pt will demonstrate indep and compliance with % of the time for self management of pain. Long term goal 2: Pt will demonstrate improved score on LEFS >/= 55/80 for improved pt quality of life. Long term goal 3: The pt will report decreased pain </=1/10 R ankle at worst in order to increase ease with gait. Progress toward goals: Please refer to D/C note for details.      POST-PAIN       Pain Rating (0-10 pain scale):   0/10   Location and pain description same as pre-treatment unless indicated. Action: [x] NA   [] Perform HEP  [] Meds as prescribed  [] Modalities as prescribed   [] Call Physician     Frequency/Duration:  Plan  Plan Comment: D/C this date     Pt to continue current HEP. See objective section for any therapeutic exercise changes, additions or modifications this date.      PT Individual Minutes  Time In: 1156  Time Out: 1500  Minutes: 38  Timed Code Treatment Minutes: 38 Minutes  Procedure Minutes: N/A      Timed Activity Minutes Units   Ther Ex 28 2   Manual  10 1     Signature:  Electronically signed by Syed Michael PTA on 11/26/21 at 4:00 PM EST

## 2021-12-14 DIAGNOSIS — M79.89 PAIN AND SWELLING OF LOWER EXTREMITY, RIGHT: ICD-10-CM

## 2021-12-14 DIAGNOSIS — M79.604 PAIN AND SWELLING OF LOWER EXTREMITY, RIGHT: ICD-10-CM

## 2021-12-14 DIAGNOSIS — I82.411 ACUTE DEEP VEIN THROMBOSIS (DVT) OF FEMORAL VEIN OF RIGHT LOWER EXTREMITY (HCC): Primary | ICD-10-CM

## 2021-12-14 DIAGNOSIS — I82.4Y9 ACUTE DEEP VEIN THROMBOSIS (DVT) OF PROXIMAL VEIN OF LOWER EXTREMITY, UNSPECIFIED LATERALITY (HCC): ICD-10-CM

## 2021-12-16 ENCOUNTER — OFFICE VISIT (OUTPATIENT)
Dept: ORTHOPEDIC SURGERY | Age: 41
End: 2021-12-16
Payer: COMMERCIAL

## 2021-12-16 VITALS
WEIGHT: 220 LBS | BODY MASS INDEX: 35.36 KG/M2 | TEMPERATURE: 98.7 F | OXYGEN SATURATION: 98 % | HEART RATE: 94 BPM | HEIGHT: 66 IN

## 2021-12-16 DIAGNOSIS — S86.011A RUPTURE OF RIGHT ACHILLES TENDON, INITIAL ENCOUNTER: Primary | ICD-10-CM

## 2021-12-16 PROCEDURE — 99213 OFFICE O/P EST LOW 20 MIN: CPT | Performed by: PHYSICIAN ASSISTANT

## 2021-12-16 NOTE — PROGRESS NOTES
Transportation Needs: No Transportation Needs    Lack of Transportation (Medical): No    Lack of Transportation (Non-Medical): No   Physical Activity:     Days of Exercise per Week: Not on file    Minutes of Exercise per Session: Not on file   Stress:     Feeling of Stress : Not on file   Social Connections:     Frequency of Communication with Friends and Family: Not on file    Frequency of Social Gatherings with Friends and Family: Not on file    Attends Worship Services: Not on file    Active Member of [address] [place] [address] or Organizations: Not on file    Attends Club or Organization Meetings: Not on file    Marital Status: Not on file   Intimate Partner Violence:     Fear of Current or Ex-Partner: Not on file    Emotionally Abused: Not on file    Physically Abused: Not on file    Sexually Abused: Not on file   Housing Stability:     Unable to Pay for Housing in the Last Year: Not on file    Number of [place] in the Last Year: Not on file    Unstable Housing in the Last Year: Not on file     Family History   Problem Relation Age of Onset    Substance Abuse Father         alcohol    Cancer Paternal Grandfather         throat cancer    High Blood Pressure Mother     Asthma Mother     High Cholesterol Mother     Cancer Maternal Grandfather         lung     Allergies   Allergen Reactions    Codeine      Current Outpatient Medications on File Prior to Visit   Medication Sig Dispense Refill    rivaroxaban (XARELTO) 20 MG TABS tablet Take 1 tablet by mouth daily (with breakfast) 30 tablet 5     No current facility-administered medications on file prior to visit. Objective: There were no vitals taken for this visit. Radiographs and Laboratory Studies:   Previous diagnostic imaging studies were reviewed.        Laboratory Studies:   Lab Results   Component Value Date    WBC 13.3 (H) 08/16/2021    HGB 13.2 08/16/2021    HCT 40.1 08/16/2021    MCV 93.3 08/16/2021     08/16/2021     No results found for: SEDRATE  No results found for: CRP    Assessment and Plan:      Diagnosis Orders   1. Rupture of right Achilles tendon, initial encounter         4 months since an Achilles rupture with repair by Dr. Aniya Reynolds. She is working with therapy, and limitations with plantarflexion dorsiflexion at a previous visit but is worked very hard with therapy and has restored full strength and motion at that ankle. I am very pleased with the results and so is she. She does have some quad weakness that she is getting continue to work on. Until she is comfortable with her strength there she will remain noncontact with patient care as she works for an [employer]. Any issues moving forward she will give us a phone call but otherwise he is excellent prognosis. The above plan was discussed in thorough detail with Dr. Cristino Lozano and the patient. No orders of the defined types were placed in this encounter. No orders of the defined types were placed in this encounter. No follow-ups on file.     Edd Campos PA-C  [place] [address] [facility]  [phone]

## 2022-02-15 ENCOUNTER — OFFICE VISIT (OUTPATIENT)
Dept: INTERVENTIONAL RADIOLOGY/VASCULAR | Age: 42
End: 2022-02-15
Payer: COMMERCIAL

## 2022-02-15 VITALS
SYSTOLIC BLOOD PRESSURE: 110 MMHG | WEIGHT: 220 LBS | BODY MASS INDEX: 35.51 KG/M2 | HEART RATE: 94 BPM | DIASTOLIC BLOOD PRESSURE: 68 MMHG

## 2022-02-15 DIAGNOSIS — I82.411 ACUTE DEEP VEIN THROMBOSIS (DVT) OF FEMORAL VEIN OF RIGHT LOWER EXTREMITY (HCC): ICD-10-CM

## 2022-02-15 DIAGNOSIS — M79.89 PAIN AND SWELLING OF LOWER EXTREMITY, RIGHT: ICD-10-CM

## 2022-02-15 DIAGNOSIS — I82.4Y9 ACUTE DEEP VEIN THROMBOSIS (DVT) OF PROXIMAL VEIN OF LOWER EXTREMITY, UNSPECIFIED LATERALITY (HCC): ICD-10-CM

## 2022-02-15 DIAGNOSIS — I82.411 ACUTE DEEP VEIN THROMBOSIS (DVT) OF FEMORAL VEIN OF RIGHT LOWER EXTREMITY (HCC): Primary | ICD-10-CM

## 2022-02-15 DIAGNOSIS — I82.4Y9 ACUTE DEEP VEIN THROMBOSIS (DVT) OF PROXIMAL VEIN OF LOWER EXTREMITY, UNSPECIFIED LATERALITY (HCC): Primary | ICD-10-CM

## 2022-02-15 DIAGNOSIS — M79.604 PAIN AND SWELLING OF LOWER EXTREMITY, RIGHT: ICD-10-CM

## 2022-02-15 PROCEDURE — 99214 OFFICE O/P EST MOD 30 MIN: CPT | Performed by: NURSE PRACTITIONER

## 2022-02-15 ASSESSMENT — ENCOUNTER SYMPTOMS
RESPIRATORY NEGATIVE: 1
GASTROINTESTINAL NEGATIVE: 1
EYES NEGATIVE: 1
COLOR CHANGE: 0
SHORTNESS OF BREATH: 0
WHEEZING: 0
COUGH: 0

## 2022-02-15 NOTE — PROGRESS NOTES
Emmanuel Hill, a female of 38 y.o. came to the office 2/15/2022. Chief Complaint   Patient presents with    3 Month Follow-Up    Follow Up After Procedure     PROGRESS NOTE:     SUBJECTIVE:     2/15/2022:Alyce Johnston returns for follow up RLE duplex US to evaluate Acute RLE proximal DVT diagnosed in  August 2021. She missed her 3 month US follow up which was supposed in December. She reports right lower leg edema only with prolonged standing now. No pain. She returned back to work x 2 months ago and is now ambulating on RLE since her surgery. Compliant with Xarelto and denies any bleeding complications. Denies claudication. Denies chest pain. Denies shortness of breath. 9/8/2021: Emmanuel Hill returns for results of repeat RLE duplex US scan to evaluate extent of acute DVT onset 8/28/2021, 1.5 weeks. Surgery 8/20 for ruptured achilles tendon. Presented to ED on 8/28. RLE duplex reports total occlusive DVT from right superficial femoral vein extending through popliteal vein. Developed 5 days post procedure pain and edema in popliteal fossa area and anterior right thigh. Had surgical follow up with Ortho last week. She report that Orthopedics stated to her that performing aRLE DVT thrombectomy could complicate the surgery just done. Therefore, she does not want to proceed with RLE thrombectomy. She reports the RLE edema has decreased considerably since last OV. Mild occasional lower leg pain, only occurs with being up on feet for long period of time. Continues to have right lower leg cast.   Compliant with Xarelto. Denies claudication. Denies chest pain. Denies shortness of breath. 8/31/2021 HPI: Emmanuel Hill referred by [facility] ED for evaluation of RLE DVT. Right lower leg with cast, ambulates with crutches. Patient presents with symptoms of: [facility] Surgery 8/20 for ruptured achilles tendon.   Developed 5 days post pain and edema in popliteal fossa area and anterior right thigh. Presented to ED on 8/28. RLE duplex reports total occlusive DVT from right superficial femoral vein extending through popliteal vein. Cast and sutures due to be removed tomorrow by Ortho. Reports pain to thigh and popliteal fossa areas are not as severe or sensitive. Unsure if remaining pain and lower leg edema is more related to DVT or from her surgery. Last evening edema worsened to right toes. Toes warm to touch and pink in color. Reports slight tingling to toes since edema. Placed on Xarelto in ED 15 mg po bid. This will be managed by her PCP Dr. Jamaal Chaudhary. No previous H/O DVT or PE's. NSAIDS: Oxycodone meds with moderate relief. Leg elevation: Daily with moderate relief. Stocking use and dates: Has not done conservative therapy with daily consistent wearing of class two compression stockings for at least 6 weeks. Cannot wear due to cast.    Denies claudication. Denies chest pain. Denies shortness of breath. Family History   Problem Relation Age of Onset    Substance Abuse Father         alcohol    Cancer Paternal Grandfather         throat cancer    High Blood Pressure Mother     Asthma Mother     High Cholesterol Mother     Cancer Maternal Grandfather         lung       Past Surgical History:   Procedure Laterality Date    ACHILLES TENDON SURGERY Right 8/20/2021    RIGHT ACHILLES TENDON REPAIR performed by Glenard Aschoff, MD at [address] [facility] TYMPANOSTOMY TUBE PLACEMENT      UTERINE FIBROID SURGERY          Past Medical History:   Diagnosis Date    Acute DVT (deep venous thrombosis) ([facility] [address] 8/31/2021    Arthritis     Pap smear for cervical cancer screening 01/12/2011    neg.     PCO (polycystic ovaries)     PCOS (polycystic ovarian syndrome)     PONV (postoperative nausea and vomiting)        Social History     Socioeconomic History    Marital status: Single     Spouse name: Not on file    Number of children: Not on file    Years of education: Not on file    Highest education level: Not on file   Occupational History    Not on file   Tobacco Use    Smoking status: Former Smoker     Packs/day: 0.50     Years: 10.00     Pack years: 5.00     Types: Cigarettes    Smokeless tobacco: Never Used   Vaping Use    Vaping Use: Never used   Substance and Sexual Activity    Alcohol use: Yes     Comment: occasionally    Drug use: No    Sexual activity: Not Currently   Other Topics Concern    Not on file   Social History Narrative    Not on file     Social Determinants of Health     Financial Resource Strain:     Difficulty of Paying Living Expenses: Not on file   Food Insecurity:     Worried About Running Out of Food in the Last Year: Not on file    Minnie of Food in the Last Year: Not on file   Transportation Needs:     Lack of Transportation (Medical): Not on file    Lack of Transportation (Non-Medical):  Not on file   Physical Activity:     Days of Exercise per Week: Not on file    Minutes of Exercise per Session: Not on file   Stress:     Feeling of Stress : Not on file   Social Connections:     Frequency of Communication with Friends and Family: Not on file    Frequency of Social Gatherings with Friends and Family: Not on file    Attends Confucianism Services: Not on file    Active Member of [address] [place] [address] [employer] or Organizations: Not on file    Attends Club or Organization Meetings: Not on file    Marital Status: Not on file   Intimate Partner Violence:     Fear of Current or Ex-Partner: Not on file    Emotionally Abused: Not on file    Physically Abused: Not on file    Sexually Abused: Not on file   Housing Stability:     Unable to Pay for Housing in the Last Year: Not on file    Number of [place] in the Last Year: Not on file    Unstable Housing in the Last Year: Not on file       Allergies   Allergen Reactions    Codeine        Current Outpatient Medications on File Prior to Visit   Medication Sig Dispense Refill    rivaroxaban (XARELTO) 20 MG TABS tablet Take 1 tablet by mouth daily (with breakfast) 30 tablet 5     No current facility-administered medications on file prior to visit. Review of Systems   Constitutional: Negative. Negative for appetite change, chills, fatigue and fever. HENT: Negative. Eyes: Negative. Respiratory: Negative. Negative for cough, shortness of breath and wheezing. Cardiovascular: Positive for leg swelling (Right lower leg with prolonged standing). Negative for chest pain and palpitations. Gastrointestinal: Negative. Endocrine: Negative. Genitourinary: Negative. Musculoskeletal: Negative. Skin: Negative. Negative for color change, rash and wound. Neurological: Negative for dizziness, light-headedness, numbness and headaches. Hematological: Negative. Psychiatric/Behavioral: Negative. OBJECTIVE:  /68   Pulse 94   Wt 220 lb (99.8 kg)   BMI 35.51 kg/m²     Physical Exam  Constitutional:       General: She is not in acute distress. Appearance: Normal appearance. She is not ill-appearing. HENT:      Head: Normocephalic and atraumatic. Nose: No congestion. Cardiovascular:      Rate and Rhythm: Normal rate. Heart sounds: Normal heart sounds. Pulmonary:      Effort: Pulmonary effort is normal.   Abdominal:      Palpations: Abdomen is soft. Musculoskeletal:         General: No signs of injury. Cervical back: Normal range of motion. Right lower leg: No edema. Left lower leg: No edema. Skin:     General: Skin is warm and dry. Capillary Refill: Capillary refill takes 2 to 3 seconds. Neurological:      Mental Status: She is alert and oriented to person, place, and time. Psychiatric:         Mood and Affect: Mood normal.         Behavior: Behavior normal.       8/28/2021:  Narrative   EXAMINATION: US DUP LOWER EXTREMITY RIGHT ANTOINETTE       CLINICAL HISTORY: RIGHT-SIDED PAIN.  ACHILLES TENDON REPAIR AUGUST 20, 2021.     COMPARISONS: None available.       FINDINGS:        Compression, augmentation, and color flow demonstrated at level of right common femoral vein.       No compression, no augmentation, and no color flow identified proximal, mid, and distal right superficial femoral vein, and right popliteal vein.       Infrapopliteal venous vasculature cannot be assessed secondary to overlying lower extremity bandages.           Impression   ACUTE DEEP VEIN THROMBOSIS EXTENDING FROM RIGHT SUPERFICIAL FEMORAL VEIN THROUGH RIGHT POPLITEAL VEIN. ASSESSMENT AND PLAN:  Chart, medications reviewed. RLE follow-up US duplex scan reviewed and discussed with patient. US dictation report not available for posting. US duplex scan initial report reviewed personally by myself and discussed with patient. US scan shows improvement with complete resolution of right thrombus DVT in proximal and mid thigh femoral vein. There remains partially occluded proximal DVT in distal femoral vein and popliteal vein showing some blood flow return. Diagnosis Orders   1. Acute deep vein thrombosis (DVT) of femoral vein of right lower extremity (HCC)     2. Acute deep vein thrombosis (DVT) of proximal vein of lower extremity, unspecified laterality (HCC)     3. Pain and swelling of lower extremity, right         Plan:     No orders of the defined types were placed in this encounter. As there remains RLE proximal DVT, although with some resolution and improvement, recommend another 3-6 months anticoagulation. No further follow up care required in vascular clinic. Continue follow up care with PCP.      SON Staley - CNP

## 2022-03-04 ENCOUNTER — PATIENT MESSAGE (OUTPATIENT)
Dept: FAMILY MEDICINE CLINIC | Age: 42
End: 2022-03-04

## 2022-03-04 NOTE — TELEPHONE ENCOUNTER
From: Destiny Hartman  To: Dr. Naheed Posada: 3/4/2022 6:47 AM EST  Subject: Xarelto refill     Good Morning,      I am currently trying to schedule a follow up appointment for the blood clot in my leg. I have 0 refills left for the Xarelto 20mg medication. The first day I can schedule an appointment is March 23 and I will have completed my medication by then. The 15th will be the last pill. Can I have refills added until I have my appointment? I still have a clot around my knee from my last ultra sound a couple weeks ago. Thank you!

## 2022-04-21 ENCOUNTER — OFFICE VISIT (OUTPATIENT)
Dept: FAMILY MEDICINE CLINIC | Age: 42
End: 2022-04-21
Payer: COMMERCIAL

## 2022-04-21 VITALS
SYSTOLIC BLOOD PRESSURE: 120 MMHG | OXYGEN SATURATION: 98 % | TEMPERATURE: 98.1 F | WEIGHT: 264.4 LBS | HEIGHT: 66 IN | DIASTOLIC BLOOD PRESSURE: 74 MMHG | HEART RATE: 72 BPM | BODY MASS INDEX: 42.49 KG/M2

## 2022-04-21 DIAGNOSIS — I82.4Y9 ACUTE DEEP VEIN THROMBOSIS (DVT) OF PROXIMAL VEIN OF LOWER EXTREMITY, UNSPECIFIED LATERALITY (HCC): Primary | ICD-10-CM

## 2022-04-21 DIAGNOSIS — S86.011S RUPTURE OF RIGHT ACHILLES TENDON, SEQUELA: ICD-10-CM

## 2022-04-21 DIAGNOSIS — N92.4 EXCESSIVE BLEEDING IN PREMENOPAUSAL PERIOD: ICD-10-CM

## 2022-04-21 PROCEDURE — 99213 OFFICE O/P EST LOW 20 MIN: CPT | Performed by: FAMILY MEDICINE

## 2022-04-21 SDOH — ECONOMIC STABILITY: FOOD INSECURITY: WITHIN THE PAST 12 MONTHS, THE FOOD YOU BOUGHT JUST DIDN'T LAST AND YOU DIDN'T HAVE MONEY TO GET MORE.: NEVER TRUE

## 2022-04-21 SDOH — ECONOMIC STABILITY: TRANSPORTATION INSECURITY
IN THE PAST 12 MONTHS, HAS THE LACK OF TRANSPORTATION KEPT YOU FROM MEDICAL APPOINTMENTS OR FROM GETTING MEDICATIONS?: NO

## 2022-04-21 SDOH — ECONOMIC STABILITY: FOOD INSECURITY: WITHIN THE PAST 12 MONTHS, YOU WORRIED THAT YOUR FOOD WOULD RUN OUT BEFORE YOU GOT MONEY TO BUY MORE.: NEVER TRUE

## 2022-04-21 ASSESSMENT — PATIENT HEALTH QUESTIONNAIRE - PHQ9
1. LITTLE INTEREST OR PLEASURE IN DOING THINGS: 0
2. FEELING DOWN, DEPRESSED OR HOPELESS: 0
SUM OF ALL RESPONSES TO PHQ QUESTIONS 1-9: 0
SUM OF ALL RESPONSES TO PHQ9 QUESTIONS 1 & 2: 0

## 2022-04-21 ASSESSMENT — SOCIAL DETERMINANTS OF HEALTH (SDOH): HOW HARD IS IT FOR YOU TO PAY FOR THE VERY BASICS LIKE FOOD, HOUSING, MEDICAL CARE, AND HEATING?: NOT HARD AT ALL

## 2022-04-21 NOTE — PROGRESS NOTES
Chief Complaint   Patient presents with    Other     blood clots around knee, follow up       HPI:  Ingrid Hurtado is a 38 y.o. 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    Achilles ruptured     Complication of DVT    Following with vascular     Was suggested another 3-6 months anticoag at f/u in February     We can repeat u/s in the future     Periods have been heavy because of xarelto     Patient Active Problem List   Diagnosis    PCOS (polycystic ovarian syndrome)    PCO (polycystic ovaries)    Achilles rupture, right    Adenopathy    Acute DVT (deep venous thrombosis) ([facility])       Current Outpatient Medications   Medication Sig Dispense Refill    rivaroxaban (XARELTO) 20 MG TABS tablet Take 1 tablet by mouth daily (with breakfast) 30 tablet 5     No current facility-administered medications for this visit. Past Medical History:   Diagnosis Date    Acute DVT (deep venous thrombosis) ([facility] [address] 8/31/2021    Arthritis     Pap smear for cervical cancer screening 01/12/2011    neg.     PCO (polycystic ovaries)     PCOS (polycystic ovarian syndrome)     PONV (postoperative nausea and vomiting)      Past Surgical History:   Procedure Laterality Date    ACHILLES TENDON SURGERY Right 8/20/2021    RIGHT ACHILLES TENDON REPAIR performed by Valentín Cam MD at [address] [place] [address]       Family History   Problem Relation Age of Onset    Substance Abuse Father         alcohol    Cancer Paternal Grandfather         throat cancer    High Blood Pressure Mother     Asthma Mother     High Cholesterol Mother     Cancer Maternal Grandfather         lung     Social History     Socioeconomic History    Marital status: Single     Spouse name: None    Number of children: None    Years of education: None    Highest education level: None   Occupational History  None   Tobacco Use    Smoking status: Former Smoker     Packs/day: 0.50     Years: 10.00     Pack years: 5.00     Types: Cigarettes    Smokeless tobacco: Never Used   Vaping Use    Vaping Use: Never used   Substance and Sexual Activity    Alcohol use: Yes     Comment: occasionally    Drug use: No    Sexual activity: Not Currently   Other Topics Concern    None   Social History Narrative    None     Social Determinants of Health     Financial Resource Strain: Low Risk     Difficulty of Paying Living Expenses: Not hard at all   Food Insecurity: No Food Insecurity    Worried About Running Out of Food in the Last Year: Never true    Minnie of Food in the Last Year: Never true   Transportation Needs: No Transportation Needs    Lack of Transportation (Medical): No    Lack of Transportation (Non-Medical):  No   Physical Activity:     Days of Exercise per Week: Not on file    Minutes of Exercise per Session: Not on file   Stress:     Feeling of Stress : Not on file   Social Connections:     Frequency of Communication with Friends and Family: Not on file    Frequency of Social Gatherings with Friends and Family: Not on file    Attends Zoroastrianism Services: Not on file    Active Member of [address] [place] [address] or Organizations: Not on file    Attends Club or Organization Meetings: Not on file    Marital Status: Not on file   Intimate Partner Violence:     Fear of Current or Ex-Partner: Not on file    Emotionally Abused: Not on file    Physically Abused: Not on file    Sexually Abused: Not on file   Housing Stability:     Unable to Pay for Housing in the Last Year: Not on file    Number of [place] in the Last Year: Not on file    Unstable Housing in the Last Year: Not on file     Allergies   Allergen Reactions    Codeine        Review of Systems:   General ROS: negative for - chills, fatigue, fever, malaise, weight gain or weight loss  Respiratory ROS: no cough, shortness of breath, or wheezing  Cardiovascular ROS: no chest pain or dyspnea on exertion  Gastrointestinal ROS: no abdominal pain, change in bowel habits, or black or bloody stools  Genito-Urinary ROS: no dysuria, trouble voiding  Musculoskeletal ROS: negative for - gait disturbance, joint pain or joint stiffness  Neurological ROS: per HPI  In general patient otherwise reports feeling well. Physical Exam:  /74 (Site: Right Upper Arm)   Pulse 72   Temp 98.1 °F (36.7 °C)   Ht 5' 6\" (1.676 m)   Wt 264 lb 6.4 oz (119.9 kg)   SpO2 98%   Breastfeeding No   BMI 42.68 kg/m²     Gen: Well, NAD, Alert, Oriented x 3   HEENT: EOMI, eyes clear, MMM  Skin: without rash or jaundice  Neck: no significant lymphadenopathy or thyromegaly  Lungs: CTA B w/out Rales/Wheezes/Rhonchi, Good respiratory effort   Heart: RRR, S1S2, w/out M/R/G, non-displaced PMI   Ext: No C/C/E Bilaterally. Neuro: Neurovascularly intact w/ Sensory/Motor intact UE/LE Bilaterally. Lab Results   Component Value Date    WBC 13.3 (H) 08/16/2021    HGB 13.2 08/16/2021    HCT 40.1 08/16/2021     08/16/2021    CHOL 172 06/25/2019    TRIG 67 06/25/2019    HDL 52 06/25/2019    ALT 8 06/25/2019    AST 15 06/25/2019     06/25/2019    K 4.2 06/25/2019     06/25/2019    CREATININE 0.67 06/25/2019    BUN 12 06/25/2019    CO2 20 06/25/2019    TSH 1.430 06/25/2019    LABA1C 5.3 06/25/2019         A&P   Diagnosis Orders   1. Acute deep vein thrombosis (DVT) of proximal vein of lower extremity, unspecified laterality ([address]     2. Rupture of right Achilles tendon, sequela     3.  Excessive bleeding in premenopausal period  Inetta Fountain, Cassandra Severance, MD, OB-GYN, [facility] PELVIS COMPLETE     U/s pelvis  GYN referral     Healthy diet and exercise  Needs more regular sleep    Encouraged to cut back/quit smoking  Goal is to quit      Antonio Martinez MD

## 2022-04-27 ENCOUNTER — HOSPITAL ENCOUNTER (OUTPATIENT)
Dept: ULTRASOUND IMAGING | Age: 42
Discharge: HOME | End: 2022-04-29
Payer: COMMERCIAL

## 2022-04-27 DIAGNOSIS — N92.4 EXCESSIVE BLEEDING IN THE PREMENOPAUSAL PERIOD: ICD-10-CM

## 2022-04-27 DIAGNOSIS — N92.4 EXCESSIVE BLEEDING IN PREMENOPAUSAL PERIOD: ICD-10-CM

## 2022-04-27 DIAGNOSIS — N92.4 EXCESSIVE BLEEDING IN THE PREMENOPAUSAL PERIOD: Primary | ICD-10-CM

## 2022-04-27 PROCEDURE — 76830 TRANSVAGINAL US NON-OB: CPT

## 2022-04-27 PROCEDURE — 76856 US EXAM PELVIC COMPLETE: CPT

## 2022-06-16 ENCOUNTER — OFFICE VISIT (OUTPATIENT)
Dept: OBGYN CLINIC | Age: 42
End: 2022-06-16
Payer: COMMERCIAL

## 2022-06-16 VITALS
DIASTOLIC BLOOD PRESSURE: 78 MMHG | SYSTOLIC BLOOD PRESSURE: 128 MMHG | WEIGHT: 273 LBS | BODY MASS INDEX: 43.87 KG/M2 | HEIGHT: 66 IN

## 2022-06-16 DIAGNOSIS — Z11.51 SCREENING FOR HUMAN PAPILLOMAVIRUS: ICD-10-CM

## 2022-06-16 DIAGNOSIS — Z11.3 SCREEN FOR STD (SEXUALLY TRANSMITTED DISEASE): ICD-10-CM

## 2022-06-16 DIAGNOSIS — N92.1 MENOMETRORRHAGIA: ICD-10-CM

## 2022-06-16 DIAGNOSIS — Z01.419 PAP TEST, AS PART OF ROUTINE GYNECOLOGICAL EXAMINATION: Primary | ICD-10-CM

## 2022-06-16 DIAGNOSIS — Z12.31 SCREENING MAMMOGRAM, ENCOUNTER FOR: ICD-10-CM

## 2022-06-16 PROCEDURE — 99386 PREV VISIT NEW AGE 40-64: CPT | Performed by: OBSTETRICS & GYNECOLOGY

## 2022-06-16 PROCEDURE — 99212 OFFICE O/P EST SF 10 MIN: CPT | Performed by: OBSTETRICS & GYNECOLOGY

## 2022-06-16 ASSESSMENT — ENCOUNTER SYMPTOMS
ABDOMINAL DISTENTION: 0
ANAL BLEEDING: 0
RESPIRATORY NEGATIVE: 1
ABDOMINAL PAIN: 0
EYES NEGATIVE: 1
VOMITING: 0
RECTAL PAIN: 0
CONSTIPATION: 0
ALLERGIC/IMMUNOLOGIC NEGATIVE: 1
DIARRHEA: 0
NAUSEA: 0
BLOOD IN STOOL: 0

## 2022-06-16 ASSESSMENT — PATIENT HEALTH QUESTIONNAIRE - PHQ9
SUM OF ALL RESPONSES TO PHQ QUESTIONS 1-9: 0
2. FEELING DOWN, DEPRESSED OR HOPELESS: 0
SUM OF ALL RESPONSES TO PHQ QUESTIONS 1-9: 0
SUM OF ALL RESPONSES TO PHQ9 QUESTIONS 1 & 2: 0
SUM OF ALL RESPONSES TO PHQ QUESTIONS 1-9: 0
1. LITTLE INTEREST OR PLEASURE IN DOING THINGS: 0
SUM OF ALL RESPONSES TO PHQ QUESTIONS 1-9: 0

## 2022-06-16 ASSESSMENT — VISUAL ACUITY: OU: 1

## 2022-06-16 NOTE — PROGRESS NOTES
The patient was asked if she would like a chaperone present for her intimate exam. She  Declines the chaperone.  Roby Pompa MA

## 2022-06-16 NOTE — PROGRESS NOTES
Subjective:      Patient ID: Lieutenant Hines is a 38 y.o. female    Annual exam.  New patient; reviewed medical, surgical, social and family history. Also reviewed current medications and allergies. Pap performed and screening mammogram ordered. STD screening offered. Monthly SBE encouraged. F/U annual or prn. Pt also wished to discuss recent US done for menometrorrhagia extending her appointment time by 10 minutes. States cycles are q 21-24 days and prolonged 10-11 days. + heavy with clots. H/O PCOS. Ovarian cystectomies in past.  Increased sx since on Xarelto for DVT after ankle surgery 8/21. Discussed options for bleeding control given recent h/o DVT. Discussed endometrial ablation +/- LSTL vs hysterectomy with risks and benefits. US as follows:      EXAMINATION: US NON OB TRANSVAGINAL, US PELVIS COMPLETE       CLINICAL HISTORY: 42-year-old with irregular menses and excessive menstrual bleeding       COMPARISONS: None available.       FINDINGS: Transabdominal and transvaginal ultrasounds of the pelvis and Doppler ovarian assessments were performed. On the transabdominal study the uterus is normal in size with normal contours. It measures 11 x 6.2 x 4.6 cm. Endometrium, cervix and left    ovary are better visualized on the transvaginal study. No large cystic adnexal masses or significant free fluid in the pelvis.       On transvaginal images the endometrial echo complex has a trilaminar echotexture. It is normal in thickness measuring 5 to 6 mm. Myometrial echotexture is mildly heterogeneous throughout and there are a few tiny myometrial cysts in the body and fundus. Differential considerations do include fibroid change and adenomyosis. No defined focal myometrial masses. There are a few small cysts within the cervix. Visualized cervix otherwise unremarkable.       Left ovary is visualized adjacent to the left uterine fundus. It measures 3.2 x 2.6 x 1.8 cm with a volume of 7.8 mL.  It contains a 2.2 x 2.4 x 1.4 cm septated cyst or 2 adjacent cysts/follicles. Color flow and spectral waveforms are documented within    the left ovary. Bowel gas limits assessment of the right adnexa. Right ovary is not imaged. On limited views no large cystic adnexal masses or significant free fluid in the pelvis.           Impression   HETEROGENEOUS MYOMETRIAL ECHOTEXTURE WITH A FEW TINY MYOMETRIAL CYSTS. DIFFERENTIAL CONSIDERATIONS INCLUDE FIBROID CHANGE AND ADENOMYOSIS. NORMAL ULTRASOUND APPEARANCE OF THE ENDOMETRIAL ECHO COMPLEX.       2.4 CM SEPTATED LEFT OVARIAN CYST VERSUS 2 ADJACENT CYSTS. NONVISUALIZATION OF THE RIGHT OVARY WITH LIMITED RIGHT ADNEXAL ASSESSMENT.              After discussion as above, patient opts to proceed with hysterectomy. Referred to  [facility] for consult. All questions answered. Vitals:  /78   Ht 5' 6\" (1.676 m)   Wt 273 lb (123.8 kg)   LMP 05/22/2022   BMI 44.06 kg/m²   Past Medical History:   Diagnosis Date    Acute DVT (deep venous thrombosis) ([address] 8/31/2021    Arthritis     Pap smear for cervical cancer screening 01/12/2011    neg.  PCO (polycystic ovaries)     PCOS (polycystic ovarian syndrome)     PONV (postoperative nausea and vomiting)      Past Surgical History:   Procedure Laterality Date    ACHILLES TENDON SURGERY Right 8/20/2021    RIGHT ACHILLES TENDON REPAIR performed by Allison De La Cruz MD at [address] [place] [address]      TYMPANOSTOMY TUBE PLACEMENT       Allergies:  Codeine  Current Outpatient Medications   Medication Sig Dispense Refill    rivaroxaban (XARELTO) 20 MG TABS tablet Take 1 tablet by mouth daily (with breakfast) 30 tablet 5     No current facility-administered medications for this visit.      Social History     Socioeconomic History    Marital status: Single     Spouse name: Not on file    Number of children: Not on file    Years of education: Not on file    Highest education level: Not on file Occupational History    Not on file   Tobacco Use    Smoking status: Former Smoker     Packs/day: 0.50     Years: 10.00     Pack years: 5.00     Types: Cigarettes    Smokeless tobacco: Never Used   Vaping Use    Vaping Use: Never used   Substance and Sexual Activity    Alcohol use: Yes     Comment: occasionally    Drug use: No    Sexual activity: Not Currently   Other Topics Concern    Not on file   Social History Narrative    Not on file     Social Determinants of Health     Financial Resource Strain: Low Risk     Difficulty of Paying Living Expenses: Not hard at all   Food Insecurity: No Food Insecurity    Worried About Running Out of Food in the Last Year: Never true    Minnie of Food in the Last Year: Never true   Transportation Needs: No Transportation Needs    Lack of Transportation (Medical): No    Lack of Transportation (Non-Medical):  No   Physical Activity:     Days of Exercise per Week: Not on file    Minutes of Exercise per Session: Not on file   Stress:     Feeling of Stress : Not on file   Social Connections:     Frequency of Communication with Friends and Family: Not on file    Frequency of Social Gatherings with Friends and Family: Not on file    Attends Baptism Services: Not on file    Active Member of [address] [place] [address] [employer] or Organizations: Not on file    Attends Club or Organization Meetings: Not on file    Marital Status: Not on file   Intimate Partner Violence:     Fear of Current or Ex-Partner: Not on file    Emotionally Abused: Not on file    Physically Abused: Not on file    Sexually Abused: Not on file   Housing Stability:     Unable to Pay for Housing in the Last Year: Not on file    Number of [place] in the Last Year: Not on file    Unstable Housing in the Last Year: Not on file     Family History   Problem Relation Age of Onset    Substance Abuse Father         alcohol    Cancer Paternal Grandfather         throat cancer    High Blood Pressure Mother     Asthma Mother     High Cholesterol Mother     Cancer Maternal Grandfather         lung       Review of Systems   Constitutional: Negative. Negative for activity change, appetite change, chills, diaphoresis, fatigue, fever and unexpected weight change. HENT: Negative. Eyes: Negative. Respiratory: Negative. Cardiovascular: Negative. Gastrointestinal: Negative for abdominal distention, abdominal pain, anal bleeding, blood in stool, constipation, diarrhea, nausea, rectal pain and vomiting. Endocrine: Negative. Genitourinary: Positive for menstrual problem (Menometrorrhagia). Negative for decreased urine volume, difficulty urinating, dyspareunia, dysuria, enuresis, flank pain, frequency, genital sores, hematuria, pelvic pain, urgency, vaginal bleeding, vaginal discharge and vaginal pain. Musculoskeletal: Negative. Skin: Negative. Allergic/Immunologic: Negative. Neurological: Negative. Hematological: Negative. Psychiatric/Behavioral: Negative. Objective:     Physical Exam  Constitutional:       Appearance: She is well-developed. HENT:      Head: Normocephalic. Eyes:      General: Lids are normal. Vision grossly intact. Neck:      Thyroid: No thyromegaly. Cardiovascular:      Rate and Rhythm: Normal rate and regular rhythm. Heart sounds: Normal heart sounds. Pulmonary:      Effort: Pulmonary effort is normal. No respiratory distress. Breath sounds: Normal breath sounds. No wheezing or rales. Chest:      Chest wall: No tenderness. Breasts:      Right: Normal. No swelling, bleeding, inverted nipple, mass, nipple discharge, skin change, tenderness, axillary adenopathy or supraclavicular adenopathy. Left: Normal. No swelling, bleeding, inverted nipple, mass, nipple discharge, skin change, tenderness, axillary adenopathy or supraclavicular adenopathy. Abdominal:      General: There is no distension. Palpations: Abdomen is soft. There is no mass. Tenderness: There is no abdominal tenderness. There is no guarding or rebound. Hernia: No hernia is present. There is no hernia in the left inguinal area or right inguinal area. Genitourinary:     General: Normal vulva. Pubic Area: No rash. Labia:         Right: No rash, tenderness, lesion or injury. Left: No rash, tenderness, lesion or injury. Urethra: No prolapse, urethral swelling or urethral lesion. Vagina: Normal. No signs of injury and foreign body. No vaginal discharge, erythema, tenderness or bleeding. Cervix: No cervical motion tenderness, discharge or friability. Uterus: Not deviated, not enlarged, not fixed and not tender. Adnexa:         Right: No mass, tenderness or fullness. Left: No mass, tenderness or fullness. Rectum: Normal.   Musculoskeletal:         General: No tenderness. Normal range of motion. Cervical back: Normal range of motion and neck supple. Lymphadenopathy:      Cervical: No cervical adenopathy. Upper Body:      Right upper body: No supraclavicular or axillary adenopathy. Left upper body: No supraclavicular or axillary adenopathy. Lower Body: No right inguinal adenopathy. No left inguinal adenopathy. Skin:     General: Skin is warm and dry. Coloration: Skin is not pale. Findings: No erythema or rash. Neurological:      Mental Status: She is alert and oriented to person, place, and time. Psychiatric:         Behavior: Behavior normal.         Thought Content: Thought content normal.         Judgment: Judgment normal.         Assessment:      Diagnosis Orders   1. Pap test, as part of routine gynecological examination  PAP SMEAR   2. Screening for human papillomavirus  PAP SMEAR   3. Screen for STD (sexually transmitted disease)  PAP SMEAR   4. Screening mammogram, encounter for  ANABELLA DIGITAL SCREEN W OR WO CAD BILATERAL   5.  Menometrorrhagia  TSH with Reflex    Hemoglobin and Hematocrit         Plan:      Medications placedthis encounter:  No orders of the defined types were placed in this encounter. Orders placedthis encounter:  Orders Placed This Encounter   Procedures    ANABELLA DIGITAL SCREEN W OR WO CAD BILATERAL     Standing Status:   Future     Standing Expiration Date:   6/16/2023    PAP SMEAR     Patient History:    Patient's last menstrual period was 05/22/2022. OBGYN Status: Having periods  Past Surgical History:  8/20/2021: ACHILLES TENDON SURGERY; Right      Comment:  RIGHT ACHILLES TENDON REPAIR performed by Isaura Hyatt MD at [facility]  No date: TONSILLECTOMY  No date: TYMPANOSTOMY TUBE PLACEMENT  No date: UTERINE FIBROID SURGERY      Social History    Tobacco Use      Smoking status: Former Smoker        Packs/day: 0.50        Years: 10.00        Pack years: 5        Types: Cigarettes      Smokeless tobacco: Never Used       Standing Status:   Future     Standing Expiration Date:   6/16/2023     Order Specific Question:   Collection Type     Answer:   SurePath     Order Specific Question:   Prior Abnormal Pap Test     Answer:   No     Order Specific Question:   Screening or Diagnostic     Answer:   Screening     Order Specific Question:   HPV Requested? Answer:   Yes     Comments:   16/18     Order Specific Question:   High Risk Patient     Answer:   N/A    TSH with Reflex     Standing Status:   Future     Standing Expiration Date:   6/16/2023    Hemoglobin and Hematocrit     Standing Status:   Future     Standing Expiration Date:   6/16/2023         Follow up:  Return for [facility] Consult ( surgery ), Annual.   Repeat Annual GYN exam every 1 year. Cervical Cytology exam starts age 24. If Negative Cytology;  follow-up screening per current guidelines. Mammograms yearly starting at age 36. Calcium and Vitamin D dosing reviewed ( age appropriate ). Colonoscopy and bone density screening discussed ( age appropriate ).     Birth control and STD prevention discussed ( age appropriate ). Gardisil counseling completed for all patients ( age appropriate ). Routine health maintenance ( per PCP and guidelines ).

## 2022-07-06 ENCOUNTER — OFFICE VISIT (OUTPATIENT)
Dept: OBGYN CLINIC | Age: 42
End: 2022-07-06
Payer: COMMERCIAL

## 2022-07-06 VITALS
BODY MASS INDEX: 43.87 KG/M2 | WEIGHT: 273 LBS | HEART RATE: 100 BPM | SYSTOLIC BLOOD PRESSURE: 120 MMHG | HEIGHT: 66 IN | DIASTOLIC BLOOD PRESSURE: 78 MMHG

## 2022-07-06 DIAGNOSIS — G89.18 POSTOPERATIVE PAIN: ICD-10-CM

## 2022-07-06 DIAGNOSIS — Z01.419 PAP TEST, AS PART OF ROUTINE GYNECOLOGICAL EXAMINATION: ICD-10-CM

## 2022-07-06 DIAGNOSIS — N92.1 MENOMETRORRHAGIA: ICD-10-CM

## 2022-07-06 DIAGNOSIS — Z11.51 SCREENING FOR HUMAN PAPILLOMAVIRUS: ICD-10-CM

## 2022-07-06 DIAGNOSIS — N83.202 LEFT OVARIAN CYST: Primary | ICD-10-CM

## 2022-07-06 DIAGNOSIS — N83.202 LEFT OVARIAN CYST: ICD-10-CM

## 2022-07-06 LAB
HCT VFR BLD CALC: 32.5 % (ref 37–47)
HEMOGLOBIN: 10.1 G/DL (ref 12–16)
TSH REFLEX: 1.4 UIU/ML (ref 0.44–3.86)

## 2022-07-06 PROCEDURE — 99203 OFFICE O/P NEW LOW 30 MIN: CPT | Performed by: OBSTETRICS & GYNECOLOGY

## 2022-07-06 RX ORDER — OXYCODONE HYDROCHLORIDE AND ACETAMINOPHEN 5; 325 MG/1; MG/1
2 TABLET ORAL NIGHTLY PRN
Qty: 2 TABLET | Refills: 0 | Status: SHIPPED | OUTPATIENT
Start: 2022-07-06 | End: 2022-07-07

## 2022-07-06 RX ORDER — LORAZEPAM 1 MG/1
TABLET ORAL
Qty: 1 TABLET | Refills: 0 | Status: SHIPPED | OUTPATIENT
Start: 2022-07-06 | End: 2022-07-06

## 2022-07-06 RX ORDER — IBUPROFEN 800 MG/1
TABLET ORAL
Qty: 40 TABLET | Refills: 0 | Status: SHIPPED | OUTPATIENT
Start: 2022-07-06 | End: 2022-09-15

## 2022-07-06 NOTE — PROGRESS NOTES
Eugene Espino is a 38 y.o. female who presents here today for complaints of Surgical Consult (Referred by Dr. Pauline Sotelo)  At the vaginal bleeding with prolonged periods of blood clots, periods last for 10 days each at a time going on for the past year worsening ultrasound with abnormal findings including enlarged uterus. Patient currently on Xarelto due to DVT for prior surgery done due to a ruptured ligament complicated by DVT. Patient also with ultrasound showing left ovarian cysts, patient asymptomatic. .      Vitals:  /78 (Site: Right Upper Arm, Position: Sitting, Cuff Size: Large Adult)   Pulse 100   Ht 5' 6\" (1.676 m)   Wt 273 lb (123.8 kg)   BMI 44.06 kg/m²   Allergies:  Codeine  Past Medical History:   Diagnosis Date    Acute DVT (deep venous thrombosis) ([facility] 2021    Arthritis     Pap smear for cervical cancer screening 2011    neg.     PCO (polycystic ovaries)     PCOS (polycystic ovarian syndrome)     PONV (postoperative nausea and vomiting)      Past Surgical History:   Procedure Laterality Date    ACHILLES TENDON SURGERY Right 2021    RIGHT ACHILLES TENDON REPAIR performed by Farhad Beckman MD at [address] [place] [address]      TYMPANOSTOMY TUBE PLACEMENT       OB History        1    Para   1    Term   1            AB        Living   1       SAB        IAB        Ectopic        Molar        Multiple        Live Births   1              Family History   Problem Relation Age of Onset    Substance Abuse Father         alcohol    Cancer Paternal Grandfather         throat cancer    High Blood Pressure Mother     Asthma Mother     High Cholesterol Mother     Cancer Maternal Grandfather         lung     Social History     Socioeconomic History    Marital status: Single     Spouse name: Not on file    Number of children: Not on file    Years of education: Not on file    Highest education level: Not on file   Occupational History    Not on file   Tobacco Use    Smoking status: Former Smoker     Packs/day: 0.50     Years: 10.00     Pack years: 5.00     Types: Cigarettes    Smokeless tobacco: Never Used   Vaping Use    Vaping Use: Never used   Substance and Sexual Activity    Alcohol use: Yes     Comment: occasionally    Drug use: No    Sexual activity: Not Currently   Other Topics Concern    Not on file   Social History Narrative    Not on file     Social Determinants of Health     Financial Resource Strain: Low Risk     Difficulty of Paying Living Expenses: Not hard at all   Food Insecurity: No Food Insecurity    Worried About Running Out of Food in the Last Year: Never true    Minnie of Food in the Last Year: Never true   Transportation Needs: No Transportation Needs    Lack of Transportation (Medical): No    Lack of Transportation (Non-Medical): No   Physical Activity:     Days of Exercise per Week: Not on file    Minutes of Exercise per Session: Not on file   Stress:     Feeling of Stress : Not on file   Social Connections:     Frequency of Communication with Friends and Family: Not on file    Frequency of Social Gatherings with Friends and Family: Not on file    Attends Roman [employer] Services: Not on file    Active Member of [address] [place] [address] or Organizations: Not on file    Attends Club or Organization Meetings: Not on file    Marital Status: Not on file   Intimate Partner Violence:     Fear of Current or Ex-Partner: Not on file    Emotionally Abused: Not on file    Physically Abused: Not on file    Sexually Abused: Not on file   Housing Stability:     Unable to Pay for Housing in the Last Year: Not on file    Number of [place] in the Last Year: Not on file    Unstable Housing in the Last Year: Not on file       Contraceptive method:  none    Patient's medications, allergies, past medical, surgical, social and family histories were reviewed and updated as appropriate.     Review of Systems  As per chief complaint   All other systems reviewed and are negative. Physical Exam:  Vitals:  /78 (Site: Right Upper Arm, Position: Sitting, Cuff Size: Large Adult)   Pulse 100   Ht 5' 6\" (1.676 m)   Wt 273 lb (123.8 kg)   BMI 44.06 kg/m²   Lungs: CTAB   Heart : Regular S1/S2, no M/R/G  Abdomen: Soft , NT, ND , + BS   Pelvic exam : deferred    Assessment:      Diagnosis Orders   1. Left ovarian cyst  Ca 125   2. Postoperative pain  LORazepam (ATIVAN) 1 MG tablet    oxyCODONE-acetaminophen (PERCOCET) 5-325 MG per tablet       Plan: For Endo C and biopsy  Medical clearance from Dr. Anatoly Manzano , to see if patient could stop anticoagulant for a week prior to the hysterectomy if decided    CBC and other blood testing ordered  Return for Endo C and biopsy      Orders Placed This Encounter   Procedures    Ca 125     Standing Status:   Future     Standing Expiration Date:   7/6/2023     Orders Placed This Encounter   Medications    ibuprofen (ADVIL;MOTRIN) 800 MG tablet     Sig: Take one tablet 30 mins before procedure then use every 8 hrs as needed. Dispense:  40 tablet     Refill:  0    LORazepam (ATIVAN) 1 MG tablet     Sig: Take pill 30 mins before procedure. Dispense:  1 tablet     Refill:  0    oxyCODONE-acetaminophen (PERCOCET) 5-325 MG per tablet     Sig: Take 2 tablets by mouth nightly as needed for Pain for up to 1 day. Take 2 tablets 30 min prior to procedure     Dispense:  2 tablet     Refill:  0     Reduce doses taken as pain becomes manageable       Follow Up:  Return in about 1 week (around 7/13/2022) for scheduled for Endosee with biopsy.         Airam Wick MD

## 2022-07-07 LAB — CA 125: 39 U/ML

## 2022-07-08 ENCOUNTER — HOSPITAL ENCOUNTER (OUTPATIENT)
Dept: WOMENS IMAGING | Age: 42
Discharge: HOME | End: 2022-07-10
Payer: COMMERCIAL

## 2022-07-08 VITALS — HEIGHT: 66 IN | BODY MASS INDEX: 44.06 KG/M2

## 2022-07-08 DIAGNOSIS — Z12.31 SCREENING MAMMOGRAM, ENCOUNTER FOR: ICD-10-CM

## 2022-07-08 PROCEDURE — 77063 BREAST TOMOSYNTHESIS BI: CPT

## 2022-07-19 ENCOUNTER — TELEPHONE (OUTPATIENT)
Dept: OBGYN CLINIC | Age: 42
End: 2022-07-19

## 2022-07-19 NOTE — TELEPHONE ENCOUNTER
----- Message from Kermitt Schlatter, MD sent at 7/10/2022  3:59 PM EDT -----  Anemia. Iron sulfate 325 mg po bid.

## 2022-08-03 ENCOUNTER — PROCEDURE VISIT (OUTPATIENT)
Dept: OBGYN CLINIC | Age: 42
End: 2022-08-03
Payer: COMMERCIAL

## 2022-08-03 VITALS
HEIGHT: 66 IN | WEIGHT: 271 LBS | BODY MASS INDEX: 43.55 KG/M2 | DIASTOLIC BLOOD PRESSURE: 72 MMHG | HEART RATE: 96 BPM | SYSTOLIC BLOOD PRESSURE: 110 MMHG

## 2022-08-03 DIAGNOSIS — N93.9 ABNORMAL UTERINE BLEEDING (AUB): Primary | ICD-10-CM

## 2022-08-03 DIAGNOSIS — N93.9 ABNORMAL UTERINE BLEEDING (AUB): ICD-10-CM

## 2022-08-03 PROCEDURE — 58558 HYSTEROSCOPY BIOPSY: CPT | Performed by: OBSTETRICS & GYNECOLOGY

## 2022-08-03 RX ORDER — FERROUS SULFATE 325(65) MG
325 TABLET ORAL
COMMUNITY

## 2022-08-03 RX ORDER — CLINDAMYCIN PHOSPHATE 20 MG/G
CREAM VAGINAL
Qty: 1 EACH | Refills: 0 | Status: SHIPPED | OUTPATIENT
Start: 2022-08-03 | End: 2022-10-05

## 2022-08-03 RX ORDER — POLYETHYLENE GLYCOL 3350 17 G/17G
POWDER, FOR SOLUTION ORAL
Qty: 1020 G | Refills: 0 | Status: SHIPPED | OUTPATIENT
Start: 2022-08-03

## 2022-08-03 NOTE — PROGRESS NOTES
Endosee Office hysteroscopy procedure    Preoperative diagnosis: AUB  Postoperative diagnosis: AUB  Procedure:  EndoSee office hysteroscopy and Endometrial Biopsy    Procedure Details   After informed consent speculum placed and cervix was prepped with betadine. Kendal clamp placed on the anterior lip of the cervix. Cervix was dilated to 3 mm using os finder. Then the Interceed catheter with the lens introduced the cervix and inducing monitor was then assembled to the catheter successfully and with the aid of normal saline injected by a 60 mL syringe slowly by my assistant that uterine cavity was examined . Jessica García Operative findings are as noted below, pictures taken as well. EndoSee removed and EMB done with Bonnie. Kendal removed, good hemostasis noted, speculum removed. Patient tolerated well. Will follow-up in 2 weeks. Operative findings : Normal endometrial cavity, possible endometrial thickening, biopsy obtained  Condition : Patient stable at the end of the procedure    ALIYAH Noel M.D.. G

## 2022-09-15 ENCOUNTER — TELEPHONE (OUTPATIENT)
Dept: FAMILY MEDICINE CLINIC | Age: 42
End: 2022-09-15

## 2022-09-15 ENCOUNTER — HOSPITAL ENCOUNTER (OUTPATIENT)
Dept: PREADMISSION TESTING | Age: 42
Discharge: HOME | End: 2022-09-19
Payer: COMMERCIAL

## 2022-09-15 VITALS
WEIGHT: 274 LBS | HEIGHT: 68 IN | RESPIRATION RATE: 16 BRPM | HEART RATE: 90 BPM | SYSTOLIC BLOOD PRESSURE: 139 MMHG | BODY MASS INDEX: 41.52 KG/M2 | OXYGEN SATURATION: 98 % | DIASTOLIC BLOOD PRESSURE: 81 MMHG | TEMPERATURE: 98.1 F

## 2022-09-15 LAB
ABO/RH: NORMAL
ANTIBODY SCREEN: NORMAL
HCT VFR BLD CALC: 32.3 % (ref 37–47)
HEMOGLOBIN: 10 G/DL (ref 12–16)
MCH RBC QN AUTO: 23.1 PG (ref 27–31.3)
MCHC RBC AUTO-ENTMCNC: 30.8 % (ref 33–37)
MCV RBC AUTO: 75 FL (ref 82–100)
PDW BLD-RTO: 18.9 % (ref 11.5–14.5)
PLATELET # BLD: 433 K/UL (ref 130–400)
RBC # BLD: 4.31 M/UL (ref 4.2–5.4)
WBC # BLD: 6.9 K/UL (ref 4.8–10.8)

## 2022-09-15 PROCEDURE — 86850 RBC ANTIBODY SCREEN: CPT

## 2022-09-15 PROCEDURE — 85027 COMPLETE CBC AUTOMATED: CPT

## 2022-09-15 PROCEDURE — 86900 BLOOD TYPING SEROLOGIC ABO: CPT

## 2022-09-15 PROCEDURE — 86901 BLOOD TYPING SEROLOGIC RH(D): CPT

## 2022-09-15 RX ORDER — SODIUM CHLORIDE 0.9 % (FLUSH) 0.9 %
5-40 SYRINGE (ML) INJECTION EVERY 12 HOURS SCHEDULED
Status: CANCELLED | OUTPATIENT
Start: 2022-09-22

## 2022-09-15 RX ORDER — SODIUM CHLORIDE, SODIUM LACTATE, POTASSIUM CHLORIDE, CALCIUM CHLORIDE 600; 310; 30; 20 MG/100ML; MG/100ML; MG/100ML; MG/100ML
INJECTION, SOLUTION INTRAVENOUS CONTINUOUS
Status: CANCELLED | OUTPATIENT
Start: 2022-09-22

## 2022-09-15 RX ORDER — SODIUM CHLORIDE 9 MG/ML
INJECTION, SOLUTION INTRAVENOUS PRN
Status: CANCELLED | OUTPATIENT
Start: 2022-09-22

## 2022-09-15 RX ORDER — SODIUM CHLORIDE 0.9 % (FLUSH) 0.9 %
5-40 SYRINGE (ML) INJECTION PRN
Status: CANCELLED | OUTPATIENT
Start: 2022-09-22

## 2022-09-15 RX ORDER — LIDOCAINE HYDROCHLORIDE 10 MG/ML
1 INJECTION, SOLUTION EPIDURAL; INFILTRATION; INTRACAUDAL; PERINEURAL
Status: CANCELLED | OUTPATIENT
Start: 2022-09-22 | End: 2022-09-22

## 2022-09-15 NOTE — PROGRESS NOTES
Deepti REYEZ and Dr. Irena Durant  instruction sheet reviewed with patient, who verbalized understanding. Call placed to Dr. Irena Durant and Dr. Juan Antonio Wheeler office with order on when to stop Xarelto.

## 2022-09-15 NOTE — TELEPHONE ENCOUNTER
Pre-admission testing called and Pt needs to know when to stop taking xarelto for their procedure.     Let Aminah Dockery know @ [phone]    Or fax @ [phone]

## 2022-09-22 ENCOUNTER — HOSPITAL ENCOUNTER (OUTPATIENT)
Age: 42
Setting detail: OUTPATIENT SURGERY
Discharge: HOME | End: 2022-09-22
Attending: OBSTETRICS & GYNECOLOGY | Admitting: OBSTETRICS & GYNECOLOGY
Payer: COMMERCIAL

## 2022-09-22 ENCOUNTER — ANESTHESIA EVENT (OUTPATIENT)
Dept: OPERATING ROOM | Age: 42
End: 2022-09-22
Payer: COMMERCIAL

## 2022-09-22 ENCOUNTER — ANESTHESIA (OUTPATIENT)
Dept: OPERATING ROOM | Age: 42
End: 2022-09-22
Payer: COMMERCIAL

## 2022-09-22 VITALS
HEIGHT: 67 IN | WEIGHT: 270 LBS | RESPIRATION RATE: 13 BRPM | TEMPERATURE: 97.8 F | DIASTOLIC BLOOD PRESSURE: 81 MMHG | OXYGEN SATURATION: 99 % | SYSTOLIC BLOOD PRESSURE: 169 MMHG | HEART RATE: 76 BPM | BODY MASS INDEX: 42.38 KG/M2

## 2022-09-22 DIAGNOSIS — G89.18 POSTOPERATIVE PAIN: Primary | ICD-10-CM

## 2022-09-22 DIAGNOSIS — N80.9 ENDOMETRIOSIS: ICD-10-CM

## 2022-09-22 DIAGNOSIS — N93.9 UTERINE BLEEDING: ICD-10-CM

## 2022-09-22 LAB
HCG, URINE, POC: NEGATIVE
Lab: NORMAL
NEGATIVE QC PASS/FAIL: NORMAL
POSITIVE QC PASS/FAIL: NORMAL

## 2022-09-22 PROCEDURE — 6370000000 HC RX 637 (ALT 250 FOR IP): Performed by: OBSTETRICS & GYNECOLOGY

## 2022-09-22 PROCEDURE — 58662 LAPAROSCOPY EXCISE LESIONS: CPT | Performed by: OBSTETRICS & GYNECOLOGY

## 2022-09-22 PROCEDURE — 3600000014 HC SURGERY LEVEL 4 ADDTL 15MIN: Performed by: OBSTETRICS & GYNECOLOGY

## 2022-09-22 PROCEDURE — 6360000002 HC RX W HCPCS: Performed by: STUDENT IN AN ORGANIZED HEALTH CARE EDUCATION/TRAINING PROGRAM

## 2022-09-22 PROCEDURE — 88342 IMHCHEM/IMCYTCHM 1ST ANTB: CPT

## 2022-09-22 PROCEDURE — 3700000000 HC ANESTHESIA ATTENDED CARE: Performed by: OBSTETRICS & GYNECOLOGY

## 2022-09-22 PROCEDURE — 7100000000 HC PACU RECOVERY - FIRST 15 MIN: Performed by: OBSTETRICS & GYNECOLOGY

## 2022-09-22 PROCEDURE — 7100000011 HC PHASE II RECOVERY - ADDTL 15 MIN: Performed by: OBSTETRICS & GYNECOLOGY

## 2022-09-22 PROCEDURE — 2709999900 HC NON-CHARGEABLE SUPPLY: Performed by: OBSTETRICS & GYNECOLOGY

## 2022-09-22 PROCEDURE — 2580000003 HC RX 258: Performed by: OBSTETRICS & GYNECOLOGY

## 2022-09-22 PROCEDURE — 6360000002 HC RX W HCPCS: Performed by: OBSTETRICS & GYNECOLOGY

## 2022-09-22 PROCEDURE — 7100000010 HC PHASE II RECOVERY - FIRST 15 MIN: Performed by: OBSTETRICS & GYNECOLOGY

## 2022-09-22 PROCEDURE — 64488 TAP BLOCK BI INJECTION: CPT | Performed by: STUDENT IN AN ORGANIZED HEALTH CARE EDUCATION/TRAINING PROGRAM

## 2022-09-22 PROCEDURE — 44180 LAP ENTEROLYSIS: CPT | Performed by: COLON & RECTAL SURGERY

## 2022-09-22 PROCEDURE — 2720000010 HC SURG SUPPLY STERILE: Performed by: OBSTETRICS & GYNECOLOGY

## 2022-09-22 PROCEDURE — 3600000004 HC SURGERY LEVEL 4 BASE: Performed by: OBSTETRICS & GYNECOLOGY

## 2022-09-22 PROCEDURE — 88307 TISSUE EXAM BY PATHOLOGIST: CPT

## 2022-09-22 PROCEDURE — A4217 STERILE WATER/SALINE, 500 ML: HCPCS | Performed by: OBSTETRICS & GYNECOLOGY

## 2022-09-22 PROCEDURE — 58571 TLH W/T/O 250 G OR LESS: CPT | Performed by: OBSTETRICS & GYNECOLOGY

## 2022-09-22 PROCEDURE — 3700000001 HC ADD 15 MINUTES (ANESTHESIA): Performed by: OBSTETRICS & GYNECOLOGY

## 2022-09-22 PROCEDURE — 2500000003 HC RX 250 WO HCPCS: Performed by: NURSE ANESTHETIST, CERTIFIED REGISTERED

## 2022-09-22 PROCEDURE — 7100000001 HC PACU RECOVERY - ADDTL 15 MIN: Performed by: OBSTETRICS & GYNECOLOGY

## 2022-09-22 PROCEDURE — 6360000002 HC RX W HCPCS: Performed by: NURSE ANESTHETIST, CERTIFIED REGISTERED

## 2022-09-22 PROCEDURE — 2580000003 HC RX 258

## 2022-09-22 RX ORDER — ONDANSETRON 2 MG/ML
4 INJECTION INTRAMUSCULAR; INTRAVENOUS
Status: COMPLETED | OUTPATIENT
Start: 2022-09-22 | End: 2022-09-22

## 2022-09-22 RX ORDER — OXYCODONE HYDROCHLORIDE 5 MG/1
10 TABLET ORAL EVERY 4 HOURS PRN
Status: DISCONTINUED | OUTPATIENT
Start: 2022-09-22 | End: 2022-09-22 | Stop reason: HOSPADM

## 2022-09-22 RX ORDER — PROCHLORPERAZINE EDISYLATE 5 MG/ML
5 INJECTION INTRAMUSCULAR; INTRAVENOUS
Status: COMPLETED | OUTPATIENT
Start: 2022-09-22 | End: 2022-09-22

## 2022-09-22 RX ORDER — ROCURONIUM BROMIDE 10 MG/ML
INJECTION, SOLUTION INTRAVENOUS PRN
Status: DISCONTINUED | OUTPATIENT
Start: 2022-09-22 | End: 2022-09-22 | Stop reason: SDUPTHER

## 2022-09-22 RX ORDER — KETOROLAC TROMETHAMINE 30 MG/ML
30 INJECTION, SOLUTION INTRAMUSCULAR; INTRAVENOUS EVERY 6 HOURS
Status: DISCONTINUED | OUTPATIENT
Start: 2022-09-22 | End: 2022-09-22 | Stop reason: HOSPADM

## 2022-09-22 RX ORDER — PROPOFOL 10 MG/ML
INJECTION, EMULSION INTRAVENOUS PRN
Status: DISCONTINUED | OUTPATIENT
Start: 2022-09-22 | End: 2022-09-22 | Stop reason: SDUPTHER

## 2022-09-22 RX ORDER — KETOROLAC TROMETHAMINE 30 MG/ML
INJECTION, SOLUTION INTRAMUSCULAR; INTRAVENOUS PRN
Status: DISCONTINUED | OUTPATIENT
Start: 2022-09-22 | End: 2022-09-22 | Stop reason: SDUPTHER

## 2022-09-22 RX ORDER — SODIUM CHLORIDE, SODIUM LACTATE, POTASSIUM CHLORIDE, CALCIUM CHLORIDE 600; 310; 30; 20 MG/100ML; MG/100ML; MG/100ML; MG/100ML
INJECTION, SOLUTION INTRAVENOUS CONTINUOUS
Status: DISCONTINUED | OUTPATIENT
Start: 2022-09-22 | End: 2022-09-22 | Stop reason: HOSPADM

## 2022-09-22 RX ORDER — SODIUM CHLORIDE 0.9 % (FLUSH) 0.9 %
5-40 SYRINGE (ML) INJECTION EVERY 12 HOURS SCHEDULED
Status: DISCONTINUED | OUTPATIENT
Start: 2022-09-22 | End: 2022-09-22 | Stop reason: HOSPADM

## 2022-09-22 RX ORDER — SODIUM CHLORIDE 0.9 % (FLUSH) 0.9 %
5-40 SYRINGE (ML) INJECTION PRN
Status: DISCONTINUED | OUTPATIENT
Start: 2022-09-22 | End: 2022-09-22 | Stop reason: HOSPADM

## 2022-09-22 RX ORDER — SODIUM CHLORIDE 9 MG/ML
INJECTION, SOLUTION INTRAVENOUS PRN
Status: DISCONTINUED | OUTPATIENT
Start: 2022-09-22 | End: 2022-09-22 | Stop reason: HOSPADM

## 2022-09-22 RX ORDER — FENTANYL CITRATE 50 UG/ML
50 INJECTION, SOLUTION INTRAMUSCULAR; INTRAVENOUS EVERY 5 MIN PRN
Status: DISCONTINUED | OUTPATIENT
Start: 2022-09-22 | End: 2022-09-22 | Stop reason: HOSPADM

## 2022-09-22 RX ORDER — OXYCODONE HYDROCHLORIDE AND ACETAMINOPHEN 5; 325 MG/1; MG/1
2 TABLET ORAL EVERY EVENING
Qty: 10 TABLET | Refills: 0 | Status: SHIPPED | OUTPATIENT
Start: 2022-09-22 | End: 2022-09-27

## 2022-09-22 RX ORDER — HYDRALAZINE HYDROCHLORIDE 20 MG/ML
10 INJECTION INTRAMUSCULAR; INTRAVENOUS
Status: DISCONTINUED | OUTPATIENT
Start: 2022-09-22 | End: 2022-09-22 | Stop reason: HOSPADM

## 2022-09-22 RX ORDER — FENTANYL CITRATE 50 UG/ML
25 INJECTION, SOLUTION INTRAMUSCULAR; INTRAVENOUS EVERY 5 MIN PRN
Status: DISCONTINUED | OUTPATIENT
Start: 2022-09-22 | End: 2022-09-22 | Stop reason: HOSPADM

## 2022-09-22 RX ORDER — DOCUSATE SODIUM 100 MG/1
100 CAPSULE, LIQUID FILLED ORAL 2 TIMES DAILY PRN
Qty: 60 CAPSULE | Refills: 2 | Status: SHIPPED | OUTPATIENT
Start: 2022-09-22

## 2022-09-22 RX ORDER — IBUPROFEN 800 MG/1
800 TABLET ORAL EVERY 6 HOURS PRN
Qty: 60 TABLET | Refills: 0 | Status: SHIPPED | OUTPATIENT
Start: 2022-09-22 | End: 2022-10-05

## 2022-09-22 RX ORDER — DIPHENHYDRAMINE HYDROCHLORIDE 50 MG/ML
12.5 INJECTION INTRAMUSCULAR; INTRAVENOUS
Status: DISCONTINUED | OUTPATIENT
Start: 2022-09-22 | End: 2022-09-22 | Stop reason: HOSPADM

## 2022-09-22 RX ORDER — SODIUM CHLORIDE 9 MG/ML
25 INJECTION, SOLUTION INTRAVENOUS PRN
Status: DISCONTINUED | OUTPATIENT
Start: 2022-09-22 | End: 2022-09-22 | Stop reason: HOSPADM

## 2022-09-22 RX ORDER — ONDANSETRON 4 MG/1
4 TABLET, ORALLY DISINTEGRATING ORAL EVERY 8 HOURS PRN
Status: DISCONTINUED | OUTPATIENT
Start: 2022-09-22 | End: 2022-09-22 | Stop reason: HOSPADM

## 2022-09-22 RX ORDER — MEPERIDINE HYDROCHLORIDE 25 MG/ML
12.5 INJECTION INTRAMUSCULAR; INTRAVENOUS; SUBCUTANEOUS EVERY 5 MIN PRN
Status: DISCONTINUED | OUTPATIENT
Start: 2022-09-22 | End: 2022-09-22 | Stop reason: HOSPADM

## 2022-09-22 RX ORDER — ONDANSETRON 2 MG/ML
INJECTION INTRAMUSCULAR; INTRAVENOUS PRN
Status: DISCONTINUED | OUTPATIENT
Start: 2022-09-22 | End: 2022-09-22 | Stop reason: SDUPTHER

## 2022-09-22 RX ORDER — MAGNESIUM HYDROXIDE 1200 MG/15ML
LIQUID ORAL CONTINUOUS PRN
Status: DISCONTINUED | OUTPATIENT
Start: 2022-09-22 | End: 2022-09-22 | Stop reason: HOSPADM

## 2022-09-22 RX ORDER — POLYETHYLENE GLYCOL 3350 17 G/17G
17 POWDER, FOR SOLUTION ORAL 2 TIMES DAILY PRN
Qty: 1020 G | Refills: 1 | Status: SHIPPED | OUTPATIENT
Start: 2022-09-22 | End: 2022-10-22

## 2022-09-22 RX ORDER — OXYCODONE HYDROCHLORIDE 5 MG/1
10 TABLET ORAL PRN
Status: DISCONTINUED | OUTPATIENT
Start: 2022-09-22 | End: 2022-09-22 | Stop reason: HOSPADM

## 2022-09-22 RX ORDER — DEXAMETHASONE SODIUM PHOSPHATE 4 MG/ML
INJECTION, SOLUTION INTRA-ARTICULAR; INTRALESIONAL; INTRAMUSCULAR; INTRAVENOUS; SOFT TISSUE PRN
Status: DISCONTINUED | OUTPATIENT
Start: 2022-09-22 | End: 2022-09-22 | Stop reason: SDUPTHER

## 2022-09-22 RX ORDER — LIDOCAINE HYDROCHLORIDE 20 MG/ML
JELLY TOPICAL PRN
Status: DISCONTINUED | OUTPATIENT
Start: 2022-09-22 | End: 2022-09-22 | Stop reason: HOSPADM

## 2022-09-22 RX ORDER — LIDOCAINE HYDROCHLORIDE 10 MG/ML
1 INJECTION, SOLUTION EPIDURAL; INFILTRATION; INTRACAUDAL; PERINEURAL
Status: DISCONTINUED | OUTPATIENT
Start: 2022-09-22 | End: 2022-09-22 | Stop reason: HOSPADM

## 2022-09-22 RX ORDER — ONDANSETRON 2 MG/ML
4 INJECTION INTRAMUSCULAR; INTRAVENOUS EVERY 6 HOURS PRN
Status: DISCONTINUED | OUTPATIENT
Start: 2022-09-22 | End: 2022-09-22 | Stop reason: HOSPADM

## 2022-09-22 RX ORDER — LIDOCAINE HYDROCHLORIDE 20 MG/ML
INJECTION, SOLUTION INTRAVENOUS PRN
Status: DISCONTINUED | OUTPATIENT
Start: 2022-09-22 | End: 2022-09-22 | Stop reason: SDUPTHER

## 2022-09-22 RX ORDER — MIDAZOLAM HYDROCHLORIDE 1 MG/ML
INJECTION INTRAMUSCULAR; INTRAVENOUS PRN
Status: DISCONTINUED | OUTPATIENT
Start: 2022-09-22 | End: 2022-09-22 | Stop reason: SDUPTHER

## 2022-09-22 RX ORDER — ACETAMINOPHEN 325 MG/1
650 TABLET ORAL EVERY 4 HOURS PRN
Status: DISCONTINUED | OUTPATIENT
Start: 2022-09-22 | End: 2022-09-22 | Stop reason: HOSPADM

## 2022-09-22 RX ORDER — FENTANYL CITRATE 50 UG/ML
INJECTION, SOLUTION INTRAMUSCULAR; INTRAVENOUS PRN
Status: DISCONTINUED | OUTPATIENT
Start: 2022-09-22 | End: 2022-09-22 | Stop reason: SDUPTHER

## 2022-09-22 RX ORDER — LABETALOL HYDROCHLORIDE 5 MG/ML
10 INJECTION, SOLUTION INTRAVENOUS
Status: DISCONTINUED | OUTPATIENT
Start: 2022-09-22 | End: 2022-09-22 | Stop reason: HOSPADM

## 2022-09-22 RX ORDER — SIMETHICONE 80 MG
80 TABLET,CHEWABLE ORAL 4 TIMES DAILY PRN
Qty: 180 TABLET | Refills: 1 | Status: SHIPPED | OUTPATIENT
Start: 2022-09-22

## 2022-09-22 RX ORDER — OXYCODONE HYDROCHLORIDE 5 MG/1
5 TABLET ORAL PRN
Status: DISCONTINUED | OUTPATIENT
Start: 2022-09-22 | End: 2022-09-22 | Stop reason: HOSPADM

## 2022-09-22 RX ORDER — ACETAMINOPHEN 500 MG
1000 TABLET ORAL EVERY 6 HOURS PRN
Qty: 60 TABLET | Refills: 0 | Status: SHIPPED | OUTPATIENT
Start: 2022-09-22

## 2022-09-22 RX ORDER — ONDANSETRON 4 MG/1
4 TABLET, FILM COATED ORAL EVERY 6 HOURS PRN
Qty: 30 TABLET | Refills: 1 | Status: SHIPPED | OUTPATIENT
Start: 2022-09-22

## 2022-09-22 RX ORDER — DIPHENHYDRAMINE HYDROCHLORIDE 50 MG/ML
INJECTION INTRAMUSCULAR; INTRAVENOUS PRN
Status: DISCONTINUED | OUTPATIENT
Start: 2022-09-22 | End: 2022-09-22 | Stop reason: SDUPTHER

## 2022-09-22 RX ADMIN — LIDOCAINE HYDROCHLORIDE 40 MG: 20 INJECTION, SOLUTION INTRAVENOUS at 11:34

## 2022-09-22 RX ADMIN — DIPHENHYDRAMINE HYDROCHLORIDE 12.5 MG: 50 INJECTION, SOLUTION INTRAMUSCULAR; INTRAVENOUS at 11:54

## 2022-09-22 RX ADMIN — ONDANSETRON 4 MG: 2 INJECTION INTRAMUSCULAR; INTRAVENOUS at 14:34

## 2022-09-22 RX ADMIN — PROPOFOL 250 MG: 10 INJECTION, EMULSION INTRAVENOUS at 11:34

## 2022-09-22 RX ADMIN — FENTANYL CITRATE 50 MCG: 50 INJECTION, SOLUTION INTRAMUSCULAR; INTRAVENOUS at 12:10

## 2022-09-22 RX ADMIN — CEFOXITIN SODIUM 2000 MG: 2 POWDER, FOR SOLUTION INTRAVENOUS at 12:02

## 2022-09-22 RX ADMIN — FENTANYL CITRATE 25 MCG: 50 INJECTION, SOLUTION INTRAMUSCULAR; INTRAVENOUS at 14:18

## 2022-09-22 RX ADMIN — ONDANSETRON 4 MG: 2 INJECTION INTRAMUSCULAR; INTRAVENOUS at 13:30

## 2022-09-22 RX ADMIN — MIDAZOLAM HYDROCHLORIDE 2 MG: 1 INJECTION, SOLUTION INTRAMUSCULAR; INTRAVENOUS at 11:29

## 2022-09-22 RX ADMIN — SODIUM CHLORIDE, POTASSIUM CHLORIDE, SODIUM LACTATE AND CALCIUM CHLORIDE: 600; 310; 30; 20 INJECTION, SOLUTION INTRAVENOUS at 09:28

## 2022-09-22 RX ADMIN — ONDANSETRON 4 MG: 2 INJECTION INTRAMUSCULAR; INTRAVENOUS at 15:15

## 2022-09-22 RX ADMIN — FENTANYL CITRATE 50 MCG: 50 INJECTION, SOLUTION INTRAMUSCULAR; INTRAVENOUS at 11:34

## 2022-09-22 RX ADMIN — DEXAMETHASONE SODIUM PHOSPHATE 4 MG: 4 INJECTION, SOLUTION INTRAMUSCULAR; INTRAVENOUS at 11:53

## 2022-09-22 RX ADMIN — KETOROLAC TROMETHAMINE 30 MG: 30 INJECTION, SOLUTION INTRAMUSCULAR at 13:31

## 2022-09-22 RX ADMIN — PROCHLORPERAZINE EDISYLATE 5 MG: 5 INJECTION INTRAMUSCULAR; INTRAVENOUS at 15:43

## 2022-09-22 RX ADMIN — ROCURONIUM BROMIDE 70 MG: 50 INJECTION INTRAVENOUS at 11:34

## 2022-09-22 RX ADMIN — PROPOFOL 50 MG: 10 INJECTION, EMULSION INTRAVENOUS at 13:36

## 2022-09-22 RX ADMIN — SUGAMMADEX 200 MG: 100 INJECTION, SOLUTION INTRAVENOUS at 13:41

## 2022-09-22 RX ADMIN — ROCURONIUM BROMIDE 20 MG: 50 INJECTION INTRAVENOUS at 12:32

## 2022-09-22 RX ADMIN — FLUORESCEIN SODIUM 200 MG: 100 INJECTION INTRAVENOUS at 13:26

## 2022-09-22 ASSESSMENT — PAIN - FUNCTIONAL ASSESSMENT: PAIN_FUNCTIONAL_ASSESSMENT: 0-10

## 2022-09-22 ASSESSMENT — PAIN DESCRIPTION - DESCRIPTORS
DESCRIPTORS: PRESSURE
DESCRIPTORS: PRESSURE
DESCRIPTORS: CRAMPING

## 2022-09-22 ASSESSMENT — PAIN DESCRIPTION - ORIENTATION
ORIENTATION: LOWER
ORIENTATION: LOWER

## 2022-09-22 ASSESSMENT — PAIN SCALES - GENERAL
PAINLEVEL_OUTOF10: 5

## 2022-09-22 ASSESSMENT — PAIN DESCRIPTION - LOCATION
LOCATION: ABDOMEN
LOCATION: ABDOMEN

## 2022-09-22 NOTE — ANESTHESIA PRE PROCEDURE
Department of Anesthesiology  Preprocedure Note       Name:  Jonathan Adorno   Age:  38 y.o.  :  1980                                          MRN:  [id number]         Date:  2022      Surgeon: Jessie Mohs):  Shiloh Julien MD    Procedure: Procedure(s):  TOTAL LAPAROSCOPIC HYSTERECTOMY POSSIBLE LEFT SALPINGO-OOPHORECTOMY    Medications prior to admission:   Prior to Admission medications    Medication Sig Start Date End Date Taking? Authorizing Provider   ferrous sulfate (IRON 325) 325 (65 Fe) MG tablet Take 325 mg by mouth daily (with breakfast)    Historical Provider, MD   polyethylene glycol (MIRALAX) 17 GM/SCOOP powder Use ONE CAPEFUL at 10 am and then 2 pm on day prior to procedure 8/3/22   Kathi Loo MD   clindamycin (CLEOCIN) 2 % vaginal cream USE one applicatorful vaginally nightly daily for 1 week prior to procedure 8/3/22   Shiloh Julien MD   rivaroxaban (XARELTO) 20 MG TABS tablet Take 1 tablet by mouth daily (with breakfast) 3/4/22   Erinn Joe MD       Current medications:    Current Facility-Administered Medications   Medication Dose Route Frequency Provider Last Rate Last Admin    cefOXitin (MEFOXIN) 2000 mg in dextrose 5% 50 mL (mini-bag)  2,000 mg IntraVENous On Call to 3424 Patricia Ross MD        lidocaine PF 1 % injection 1 mL  1 mL IntraDERmal Once PRN Dunia Angeludlin, APRN - CNP        lactated ringers infusion   IntraVENous Continuous Lear Maudlin, APRN -  mL/hr at 22 0928 New Bag at 22 0928    sodium chloride flush 0.9 % injection 5-40 mL  5-40 mL IntraVENous 2 times per day Lear Maudlin, APRN - CNP        sodium chloride flush 0.9 % injection 5-40 mL  5-40 mL IntraVENous PRN Lear Maudlin, APRN - CNP        0.9 % sodium chloride infusion   IntraVENous PRN Dunia Maudlin, APRN - CNP           Allergies:     Allergies   Allergen Reactions    Codeine Hives and Swelling       Problem List:    Patient Active Problem List   Diagnosis Code    PCOS (polycystic ovarian syndrome) E28.2    PCO (polycystic ovaries) E28.2    Achilles rupture, right S86.011A    Adenopathy R59.9    Acute DVT (deep venous thrombosis) ([facility]) I82.409       Past Medical History:        Diagnosis Date    Acute DVT ([facility] venous thrombosis) ([facility] [address] 08/31/2021    Arthritis     Hx of blood clots     Pap smear for cervical cancer screening 01/12/2011    neg.  PCO (polycystic ovaries)     PCOS (polycystic ovarian syndrome)     PONV (postoperative nausea and vomiting)        Past Surgical History:        Procedure Laterality Date    ACHILLES TENDON SURGERY Right 08/20/2021    RIGHT ACHILLES TENDON REPAIR performed by Sidney Cervantes MD at [address] [place] [address]      TYMPANOSTOMY TUBE PLACEMENT      WISDOM TOOTH EXTRACTION         Social History:    Social History     Tobacco Use    Smoking status: Former     Packs/day: 0.50     Years: 10.00     Pack years: 5.00     Types: Cigarettes    Smokeless tobacco: Never   Substance Use Topics    Alcohol use: Yes     Comment: occasionally                                Counseling given: Not Answered      Vital Signs (Current):   Vitals:    09/22/22 0905   BP: (!) 155/78   Pulse: 89   Resp: 16   Temp: 98.2 °F (36.8 °C)   TempSrc: Temporal   SpO2: 96%   Weight: 270 lb (122.5 kg)   Height: 5' 7\" (1.702 m)                                              BP Readings from Last 3 Encounters:   09/22/22 (!) 155/78   09/15/22 139/81   08/03/22 110/72       NPO Status: Time of last liquid consumption: 2330                        Time of last solid consumption: 1800 (liquid diet yesterday )                        Date of last liquid consumption: 09/21/22                        Date of last solid food consumption: 09/20/22    BMI:   Wt Readings from Last 3 Encounters:   09/22/22 270 lb (122.5 kg)   09/15/22 274 lb (124.3 kg)   08/03/22 271 lb (122.9 kg)     Body mass index is 42.29 kg/m².     CBC:   Lab Results   Component Value Date/Time    WBC 6.9 09/15/2022 09:30 AM    RBC 4.31 09/15/2022 09:30 AM    HGB 10.0 09/15/2022 09:30 AM    HCT 32.3 09/15/2022 09:30 AM    MCV 75.0 09/15/2022 09:30 AM    RDW 18.9 09/15/2022 09:30 AM     09/15/2022 09:30 AM       CMP:   Lab Results   Component Value Date/Time     06/25/2019 09:41 AM    K 4.2 06/25/2019 09:41 AM     06/25/2019 09:41 AM    CO2 20 06/25/2019 09:41 AM    BUN 12 06/25/2019 09:41 AM    CREATININE 0.67 06/25/2019 09:41 AM    GFRAA >60.0 06/25/2019 09:41 AM    LABGLOM >60.0 06/25/2019 09:41 AM    GLUCOSE 98 06/25/2019 09:41 AM    PROT 7.1 06/25/2019 09:41 AM    CALCIUM 8.9 06/25/2019 09:41 AM    BILITOT 0.3 06/25/2019 09:41 AM    ALKPHOS 54 06/25/2019 09:41 AM    AST 15 06/25/2019 09:41 AM    ALT 8 06/25/2019 09:41 AM       POC Tests: No results for input(s): POCGLU, POCNA, POCK, POCCL, POCBUN, POCHEMO, POCHCT in the last 72 hours. Coags: No results found for: PROTIME, INR, APTT    HCG (If Applicable): No results found for: PREGTESTUR, PREGSERUM, HCG, HCGQUANT     ABGs: No results found for: PHART, PO2ART, SBO6ZNA, QQQ5UPC, BEART, M9APGWPI     Type & Screen (If Applicable):  No results found for: LABABO, LABRH    Drug/Infectious Status (If Applicable):  No results found for: HIV, HEPCAB    COVID-19 Screening (If Applicable):   Lab Results   Component Value Date/Time    COVID19 Not Detected 08/16/2021 01:54 PM           Anesthesia Evaluation  Patient summary reviewed and Nursing notes reviewed   history of anesthetic complications: PONV.   Airway: Mallampati: III  TM distance: >3 FB   Neck ROM: full  Mouth opening: > = 3 FB   Dental: normal exam         Pulmonary:Negative Pulmonary ROS and normal exam                               Cardiovascular:Negative CV ROS  Exercise tolerance: good (>4 METS),         ECG reviewed               Beta Blocker:  Not on Beta Blocker         Neuro/Psych:   Negative Neuro/Psych ROS              GI/Hepatic/Renal: Neg GI/Hepatic/Renal ROS  (+) morbid obesity         ROS comment: BMI 42. Endo/Other: Negative Endo/Other ROS   (+) blood dyscrasia::., .          Pt had PAT visit. ROS comment: Hx of DVT during post-op period for ankle surgery, takes xarelto Abdominal:             Vascular: negative vascular ROS. Other Findings:           Anesthesia Plan      general     ASA 3     (ETT)  Induction: intravenous. MIPS: Postoperative opioids intended and Prophylactic antiemetics administered. Anesthetic plan and risks discussed with patient. Plan discussed with CRNA.     Attending anesthesiologist reviewed and agrees with Pre Eval content      Post-op pain plan if not by surgeon: taisha Early MD   9/22/2022

## 2022-09-22 NOTE — ANESTHESIA PROCEDURE NOTES
Peripheral Block    Patient location during procedure: pre-op  Reason for block: post-op pain management and at surgeon's request  Start time: 9/22/2022 10:03 AM  End time: 9/22/2022 10:08 AM  Staffing  Performed: anesthesiologist   Anesthesiologist: Tess To MD  Preanesthetic Checklist  Completed: patient identified, IV checked, site marked, risks and benefits discussed, surgical/procedural consents, equipment checked, pre-op evaluation, timeout performed, anesthesia consent given, oxygen available and monitors applied/VS acknowledged  Peripheral Block   Patient position: supine  Prep: ChloraPrep  Provider prep: mask and sterile gloves (Sterile probe cover)  Patient monitoring: cardiac monitor, continuous pulse ox, frequent blood pressure checks and IV access  Block type: TAP  Laterality: bilateral  Injection technique: single-shot  Guidance: nerve stimulator and ultrasound guided  Local infiltration: bupivacaine  Infiltration strength: 0.5 %  Local infiltration: bupivacaine  Dose: 50 mL    Needle   Needle type: combined needle/nerve stimulator   Needle gauge: 22 G  Needle localization: anatomical landmarks and ultrasound guidance  Needle length: 10 cm  Assessment   Injection assessment: negative aspiration for heme, no paresthesia on injection and local visualized surrounding nerve on ultrasound  Paresthesia pain: immediately resolved  Slow fractionated injection: yes  Hemodynamics: stable  Real-time US image taken/store: yes    Additional Notes  Ultrasound image printed and saved in patient chart.     Sterile probe cover used    25 mls given per side

## 2022-09-22 NOTE — ANESTHESIA POSTPROCEDURE EVALUATION
Department of Anesthesiology  Postprocedure Note    Patient: Edgard Durant  MRN: [id number]  YOB: 1980  Date of evaluation: 9/22/2022      Procedure Summary     Date: 09/22/22 Room / Location: [address]    Anesthesia Start: 1129 Anesthesia Stop: 1910    Procedure: TOTAL LAPAROSCOPIC HYSTERECTOMY, BILATERAL SALPINGECTOMY LEFT OOPHORECTOMY, CYSTOSCOPY, LAPAROSCOPIC ADHESIOLYSIS (Abdomen) Diagnosis:       Uterine bleeding      Endometriosis      (AUB, ADENOMYOSIS, LEFT OVARIAN CYST)    Surgeons: Tanmay Izquierdo MD Responsible Provider: April Dahl MD    Anesthesia Type: general ASA Status: 3          Anesthesia Type: No value filed.     Parrish Phase I: Parrish Score: 10    Parrish Phase II:        Anesthesia Post Evaluation    Patient location during evaluation: PACU  Patient participation: complete - patient participated  Level of consciousness: awake  Pain score: 0  Airway patency: patent  Nausea & Vomiting: no nausea and no vomiting  Complications: no  Cardiovascular status: hemodynamically stable  Respiratory status: acceptable  Hydration status: euvolemic

## 2022-09-22 NOTE — H&P
H&P     Betsy Merlin is a 38 y.o. female who presents here today for complaints of Surgical Consult (Referred by Dr. Kevin Smith.)  At the vaginal bleeding with prolonged periods of blood clots, periods last for 10 days each at a time going on for the past year worsening ultrasound with abnormal findings including enlarged uterus. Patient currently on Xarelto due to DVT for prior surgery done due to a ruptured ligament complicated by DVT. Patient also with ultrasound showing left ovarian cysts, patient asymptomatic. .        Vitals:  /78 (Site: Right Upper Arm, Position: Sitting, Cuff Size: Large Adult)   Pulse 100   Ht 5' 6\" (1.676 m)   Wt 273 lb (123.8 kg)   BMI 44.06 kg/m²   Allergies:  Codeine  Past Medical History        Past Medical History:   Diagnosis Date    Acute DVT (deep venous thrombosis) ([facility] [address] 2021    Arthritis      Pap smear for cervical cancer screening 2011     neg.     PCO (polycystic ovaries)      PCOS (polycystic ovarian syndrome)      PONV (postoperative nausea and vomiting)           Past Surgical History         Past Surgical History:   Procedure Laterality Date    ACHILLES TENDON SURGERY Right 2021     RIGHT ACHILLES TENDON REPAIR performed by Francesca Vargas MD at [address] [place] [address]        TYMPANOSTOMY TUBE PLACEMENT                      OB History         1    Para   1    Term   1            AB        Living   1        SAB        IAB        Ectopic        Molar        Multiple        Live Births   1                Family History         Family History   Problem Relation Age of Onset    Substance Abuse Father           alcohol    Cancer Paternal Grandfather           throat cancer    High Blood Pressure Mother      Asthma Mother      High Cholesterol Mother      Cancer Maternal Grandfather           lung         Social History               Socioeconomic History    Marital status: Single       Spouse name: Not on file Number of children: Not on file    Years of education: Not on file    Highest education level: Not on file   Occupational History    Not on file   Tobacco Use    Smoking status: Former Smoker       Packs/day: 0.50       Years: 10.00       Pack years: 5.00       Types: Cigarettes    Smokeless tobacco: Never Used   Vaping Use    Vaping Use: Never used   Substance and Sexual Activity    Alcohol use: Yes       Comment: occasionally    Drug use: No    Sexual activity: Not Currently   Other Topics Concern    Not on file   Social History Narrative    Not on file      Social Determinants of Health          Financial Resource Strain: Low Risk     Difficulty of Paying Living Expenses: Not hard at all   Food Insecurity: No Food Insecurity    Worried About [employer] [employer] in the Last Year: Never true    [address] [employer] in the Last Year: Never true   Transportation Needs: No Transportation Needs    Lack of Transportation (Medical): No    Lack of Transportation (Non-Medical):  No   Physical Activity:     Days of Exercise per Week: Not on file    Minutes of Exercise per Session: Not on file   Stress:     Feeling of Stress : Not on file   Social Connections:     Frequency of Communication with Friends and Family: Not on file    Frequency of Social Gatherings with Friends and Family: Not on file    Attends [employer] Services: Not on file    Active Member of Clubs or Organizations: Not on file    Attends Club or Organization Meetings: Not on file    Marital Status: Not on file   Intimate Partner Violence:     Fear of Current or Ex-Partner: Not on file    Emotionally Abused: Not on file    Physically Abused: Not on file    Sexually Abused: Not on file   Housing Stability:     Unable to Pay for Housing in the Last Year: Not on file    Number of [place] in the Last Year: Not on file    Unstable Housing in the Last Year: Not on file            Contraceptive method:  none     Patient's medications, allergies, past medical, surgical, social and family histories were reviewed and updated as appropriate. Review of Systems  As per chief complaint   All other systems reviewed and are negative. Physical Exam:  Vitals:  /78 (Site: Right Upper Arm, Position: Sitting, Cuff Size: Large Adult)   Pulse 100   Ht 5' 6\" (1.676 m)   Wt 273 lb (123.8 kg)   BMI 44.06 kg/m²   Lungs: CTAB   Heart : Regular S1/S2, no M/R/G  Abdomen: Soft , NT, ND , + BS   Pelvic exam : deferred       US:   Transabdominal and transvaginal ultrasounds of the pelvis and Doppler ovarian assessments were performed. On the transabdominal study the uterus is normal in size with normal contours. It measures 11 x 6.2 x 4.6 cm. Endometrium, cervix and left    ovary are better visualized on the transvaginal study. No large cystic adnexal masses or significant free fluid in the pelvis. On transvaginal images the endometrial echo complex has a trilaminar echotexture. It is normal in thickness measuring 5 to 6 mm. Myometrial echotexture is mildly heterogeneous throughout and there are a few tiny myometrial cysts in the body and fundus. Differential considerations do include fibroid change and adenomyosis. No defined focal myometrial masses. There are a few small cysts within the cervix. Visualized cervix otherwise unremarkable. Left ovary is visualized adjacent to the left uterine fundus. It measures 3.2 x 2.6 x 1.8 cm with a volume of 7.8 mL. It contains a 2.2 x 2.4 x 1.4 cm septated cyst or 2 adjacent cysts/follicles. Color flow and spectral waveforms are documented within    the left ovary. Bowel gas limits assessment of the right adnexa. Right ovary is not imaged. On limited views no large cystic adnexal masses or significant free fluid in the pelvis. Impression   HETEROGENEOUS MYOMETRIAL ECHOTEXTURE WITH A FEW TINY MYOMETRIAL CYSTS. DIFFERENTIAL CONSIDERATIONS INCLUDE FIBROID CHANGE AND ADENOMYOSIS.  NORMAL ULTRASOUND APPEARANCE OF THE ENDOMETRIAL ECHO COMPLEX. 2.4 CM SEPTATED LEFT OVARIAN CYST VERSUS 2 ADJACENT CYSTS. NONVISUALIZATION OF THE RIGHT OVARY WITH LIMITED RIGHT ADNEXAL ASSESSMENT. Assessment:        Diagnosis Orders   1. Left ovarian cyst               2. Adenomyosis      Plan: For [facility]     Counseling: The patient was counseled on all options both medical and surgical, conservative as well as definitive. She has elected to proceed with the procedure as stated above. The patient was counseled on the procedure. Risks and complications were reviewed in detail. The patients orders, labs, consents have been completed. The history and physical as well as all supporting surgical documentation will be forwarded to the pre-operative holding area. The patient is aware that this procedure may not alleviate her symptoms. That there may be a necessity for a second surgery and that there may be an incomplete removal of abnormal tissue. Discussed all risks and benefits of procedure in detail including risks of anesthesia, blood loss, need for transfusion, infection;  also potential for complication, injury, need for repair and/or removal of uterus, tubes, ovaries, bowel, bladder, ureters, blood vessels and nerves. Also discussed pre-operative and post-operative expectations. Patient verbalizes understanding. Patient was seen with total face to face time of 30 minutes. More than 50% of this visit was counseling and education regarding There were no encounter diagnoses. and No chief complaint on file. as well as  counseling on preventative health maintenance follow-up.               Lilian Woodward MD

## 2022-09-23 PROBLEM — N80.9 ENDOMETRIOSIS: Status: ACTIVE | Noted: 2022-09-23

## 2022-09-23 PROBLEM — N93.9 ABNORMAL UTERINE BLEEDING (AUB): Status: ACTIVE | Noted: 2022-09-23

## 2022-09-23 PROBLEM — N73.6 PELVIC ADHESIONS: Status: ACTIVE | Noted: 2022-09-23

## 2022-09-23 NOTE — OP NOTE
Operative Report      Pre-operative Diagnosis: AUB, left ovarian cysts    Post-operative Diagnosis: dense pelvic adhesions , endometriosis, enlarged uterus    Operation: pelvic adhesiolysis , ureterolysis , TLH with bilateral salpingectomies, ablation of endometriosis implants using harmonic ace device. Cystoscopy/     Surgeon: Emelina Parra M.D     Anesthesia: GETA    Findings: endometriosis    Estimated Blood Loss:  less than 50 ml           Specimens: Uterus . Bilateral tubes. Complications:  None; patient tolerated the procedure well. Disposition: PACU - hemodynamically stable. Procedure Details      The patient was seen in the Holding Room. The risks, benefits, complications, treatment options, and expected outcomes were discussed with the patient. The patient concurred with the proposed plan, giving informed consent. The patient was taken to Operating Room,identified as name,  the procedure verified as TLH with LSO . A Time Out was held and the above information confirmed. After induction of anesthesia, the patient was draped and prepped in the usual sterile manner. A Cummings catheter was inserted into her bladder. Two retractors were placed into the vagina. A single tooth tenaculum was used to grasp the anterior lip of the cervix. The [employer] uterine manipulator  was placed and the retractors were removed. Attention was then turned to the abdomen into the supra-umbilical area. A 5 mm skin incision was made 1 cm above the umbilicus. A Veress needle was   then inserted. The initial pressure was 5mmhg . Pneumoperitoneum was created till 15mmhg of CO2 gas pressure was recorded on insuflator. The laparoscope was then inserted in the 5 mm trocar, and safe entry was confirmed under direct visualizaion through the trocar.      Patient was then placed in -duke and a survey of the abdomen and pelvis was performed which revealed dense pelvic adhesions on left side , incorporating left ovary , sigmoid colon. normal ovaries bilaterally otherwise . Multiple foci of endometriosis noted at posterior lower uterine aspect including uterosacra ligaments , thickening and fibrosis and adhesions on left pelvic adnexal region. At that point, decision was for three other trocars which were then inserted under direct visualization. On the left side slightly below the level of the umbilicus and 8 cm lateral to the midline, a 12mm incision was made through which a 12 mm trocar was inserted, then on the same side approximately 15 cm lateral to the midline and at the level of the anterior superior iliac spine, a 5 mm incision was made through which a 5 mm trocar was inserted under direct visualization as well. On the right side, 12 cm lateral to the midline and 5 cm below the level of the umbilicus another 5 mm incision was made through which a 5 mm trocar was inserted. Attention was then towards the left adnexal region . > 30 mins of adhesiolysis was carried out at left pelvic side wall to isolate and mobilize the infundibulo-pelvic ligament, followed by limited left ureterolysis  To located the left ureter and avoid injury given the anatomic distortion due to adhesions . The adhesions from colon to posterior lower uterine area and uterosacral ligament were then tackled carefully, I reached an area of thick adhesions to the sigmoid colon , at which point general surgery was consulted . Please refer to Dr Cristela Deal note for details . The posterior uterine aspect was freed from adhesions to the level of v-care cup that was then clearly visualized. Decision was to remove left ovary due to adhesive disease on left pelvis, as to not need any future intervention due to endometriosis which would be very complicated if needed due to further possible adhesive disease given the endometriosis .  The left infundibulo-pelvic ligament was then coagulated multiple times and transected , followed by the left tube , up to the proximal uterine attachment . Left adnexa was then placed in pelvis to be removed with the uterus vaginally later. The right fallopian tube was removed using the harmonic ace. Attention was then towards the left utero-ovarian ligament which was then grasped , coagulated and cut using the Harmonic ace device from [employer]. The bipolar cautery was also used on the field for any non hemostatic areas or bleeding pedicles and was used sequentially with the harmonic ace device to coagulate and then cut sequentially. After the left utero-ovarian ligament , then the left round ligament was coagulated and cut, followed by the anterior and posterior leaf of the broad ligament which was coagulated and cut multiple times all the way down to the uterine vessels on the left side. The anterior leaf of the broad ligament was dissected all the way anteriorly over the lower uterine segment on the left side through the bladder flap which was also dissected from that side as well in the same dissection plain. The posterior leaf of the broad ligament was also dissected posteriorly arround the edge of the V-care cup. By completing the dissection of the broad ligament, the uterine vessels were skeletonized, i used the bipolar coagulator to grasp the vessels which then were coagulated multiple times, then the harmonic ace was used and the vessels were transected completely, hemostasis was intact. Same steps were carried out on the right side all the way down to the uterine vessels which were coagulated and cut in same fashion. The bladder flap dissection was then completed from the right side, and the bladder was then pushed down in the pelvis completely freeing the anterior lower uterine segment and anterior vaginal cuff border. The v-care cup edge was then seen clearly arround the vaginal cuff. Uterus was noted to have blanched at that time.  I then used the monopolar hook to cut through the vaginal cuff surrounding the cervix and uterus creating a colpotomy, arround the edge of the v-care cup used as guidance , pushing up on the v-care cup during the procedure to prevent any ureteral injury. After the uterus and cervix were completely freed from surrounding vaginal tissue, the uterus was exteriorized vaginally. The vaginal cuff was then closed using 0-Stratifix locking suture in a continous fashion. Care was taken to incorporate distal portion of uterosacral ligaments with the cuff closure to prevent future prolapse. Focii of endometriosis were then ablated by heat using the harmonic ace hot arm , at right posterior cuff and uterosacral ligament . As well as on the left side . Irrigation was then carried out and pedicles rechecked and found to be hemostatic. The 12mm facial incision was approximated using the endo loop closure device with 0-vicryl suture utilized. At that point gas was emptied , All  the instruments were removed from the abdomen. Cystoscopy was then done and both ureters seen ejecting urine. The counts were correct . The skin incisions were closed with a subcuticular fashion and Dermabond. She was sent to the recovery room in good condition. Instrument, sponge, and needle counts were correct prior to  closure and at the conclusion of the case. Shannan Benton M.D., F.A.C.O. G

## 2022-10-05 ENCOUNTER — OFFICE VISIT (OUTPATIENT)
Dept: OBGYN CLINIC | Age: 42
End: 2022-10-05

## 2022-10-05 VITALS
SYSTOLIC BLOOD PRESSURE: 106 MMHG | HEART RATE: 108 BPM | WEIGHT: 273 LBS | DIASTOLIC BLOOD PRESSURE: 72 MMHG | HEIGHT: 67 IN | BODY MASS INDEX: 42.85 KG/M2

## 2022-10-05 DIAGNOSIS — Z09 POSTOPERATIVE EXAMINATION: Primary | ICD-10-CM

## 2022-10-05 PROCEDURE — 99024 POSTOP FOLLOW-UP VISIT: CPT | Performed by: OBSTETRICS & GYNECOLOGY

## 2022-10-05 RX ORDER — NITROFURANTOIN 25; 75 MG/1; MG/1
100 CAPSULE ORAL 2 TIMES DAILY
Qty: 20 CAPSULE | Refills: 0 | Status: SHIPPED | OUTPATIENT
Start: 2022-10-05 | End: 2022-10-15

## 2022-10-26 NOTE — PROGRESS NOTES
Postop Progress Note    Subjective    Gin Kern presents to the office for postop follow up. Objective    Vitals:    10/05/22 1044   BP: 106/72   Pulse: (!) 108     General: alert, cooperative and no distress  Incision: healing well    Assessment  Doing well postoperatively. Plan  1. Continue any current medications  2. Wound care discussed  3. Pt is to increase activities as tolerated  4. Usual diet  5.  Follow up: as needed    Electronically signed by Echo Jacob MD on 10/26/2022 at 11:55 AM

## 2022-12-06 ENCOUNTER — OFFICE VISIT (OUTPATIENT)
Dept: OBGYN CLINIC | Age: 42
End: 2022-12-06
Payer: COMMERCIAL

## 2022-12-06 VITALS
HEIGHT: 67 IN | SYSTOLIC BLOOD PRESSURE: 114 MMHG | BODY MASS INDEX: 43 KG/M2 | HEART RATE: 88 BPM | DIASTOLIC BLOOD PRESSURE: 68 MMHG | WEIGHT: 274 LBS

## 2022-12-06 DIAGNOSIS — Z09 POSTOPERATIVE EXAMINATION: ICD-10-CM

## 2022-12-06 DIAGNOSIS — Z09 POSTOPERATIVE EXAMINATION: Primary | ICD-10-CM

## 2022-12-06 LAB
BILIRUBIN URINE: NEGATIVE
BLOOD, URINE: NEGATIVE
CLARITY: CLEAR
COLOR: YELLOW
GLUCOSE URINE: NEGATIVE MG/DL
KETONES, URINE: NEGATIVE MG/DL
LEUKOCYTE ESTERASE, URINE: NEGATIVE
NITRITE, URINE: NEGATIVE
PH UA: 5.5 (ref 5–9)
PROTEIN UA: NEGATIVE MG/DL
SPECIFIC GRAVITY UA: 1.01 (ref 1–1.03)
UROBILINOGEN, URINE: 0.2 E.U./DL

## 2022-12-06 PROCEDURE — 99213 OFFICE O/P EST LOW 20 MIN: CPT | Performed by: OBSTETRICS & GYNECOLOGY

## 2022-12-06 NOTE — PROGRESS NOTES
Postop Progress Note    Subjective    Gin Kern presents to the office for postop follow up. 8 wks postop . Objective    Vitals:    12/06/22 0843   BP: 114/68   Pulse: 88     General: alert, cooperative and no distress  Incision: healing well  Vag exam : vaginal cuff healed completely. No abnormal vaginal discharge. Normal vaginal mucosa . Normal vulva , normal urethera. Assessment  Doing well postoperatively.     Plan  Return to normal activity   Schedule next annual exam     Electronically signed by Echo Jacob MD on 12/6/2022 at 5:51 PM

## 2022-12-07 LAB — URINE CULTURE, ROUTINE: NORMAL

## 2023-02-07 ENCOUNTER — OFFICE VISIT (OUTPATIENT)
Dept: FAMILY MEDICINE CLINIC | Age: 43
End: 2023-02-07
Payer: COMMERCIAL

## 2023-02-07 VITALS
OXYGEN SATURATION: 98 % | HEIGHT: 67 IN | BODY MASS INDEX: 43.47 KG/M2 | SYSTOLIC BLOOD PRESSURE: 126 MMHG | WEIGHT: 277 LBS | DIASTOLIC BLOOD PRESSURE: 80 MMHG | HEART RATE: 84 BPM | TEMPERATURE: 98.5 F

## 2023-02-07 DIAGNOSIS — I82.4Y9 ACUTE DEEP VEIN THROMBOSIS (DVT) OF PROXIMAL VEIN OF LOWER EXTREMITY, UNSPECIFIED LATERALITY (HCC): Primary | ICD-10-CM

## 2023-02-07 DIAGNOSIS — M79.89 RIGHT LEG SWELLING: ICD-10-CM

## 2023-02-07 PROCEDURE — 99213 OFFICE O/P EST LOW 20 MIN: CPT | Performed by: FAMILY MEDICINE

## 2023-02-07 SDOH — ECONOMIC STABILITY: FOOD INSECURITY: WITHIN THE PAST 12 MONTHS, YOU WORRIED THAT YOUR FOOD WOULD RUN OUT BEFORE YOU GOT MONEY TO BUY MORE.: NEVER TRUE

## 2023-02-07 SDOH — ECONOMIC STABILITY: HOUSING INSECURITY
IN THE LAST 12 MONTHS, WAS THERE A TIME WHEN YOU DID NOT HAVE A STEADY PLACE TO SLEEP OR SLEPT IN A SHELTER (INCLUDING NOW)?: NO

## 2023-02-07 SDOH — ECONOMIC STABILITY: FOOD INSECURITY: WITHIN THE PAST 12 MONTHS, THE FOOD YOU BOUGHT JUST DIDN'T LAST AND YOU DIDN'T HAVE MONEY TO GET MORE.: NEVER TRUE

## 2023-02-07 SDOH — ECONOMIC STABILITY: INCOME INSECURITY: HOW HARD IS IT FOR YOU TO PAY FOR THE VERY BASICS LIKE FOOD, HOUSING, MEDICAL CARE, AND HEATING?: NOT HARD AT ALL

## 2023-02-07 ASSESSMENT — PATIENT HEALTH QUESTIONNAIRE - PHQ9
1. LITTLE INTEREST OR PLEASURE IN DOING THINGS: 0
SUM OF ALL RESPONSES TO PHQ QUESTIONS 1-9: 0
SUM OF ALL RESPONSES TO PHQ QUESTIONS 1-9: 0
2. FEELING DOWN, DEPRESSED OR HOPELESS: 0
SUM OF ALL RESPONSES TO PHQ QUESTIONS 1-9: 0
SUM OF ALL RESPONSES TO PHQ9 QUESTIONS 1 & 2: 0
SUM OF ALL RESPONSES TO PHQ QUESTIONS 1-9: 0

## 2023-02-07 NOTE — PROGRESS NOTES
Chief Complaint   Patient presents with    Leg Pain     Right leg edema on and off x 2 weeks, on xatelto, has had pain off and on x 3 days. Elevating legs but feels same of when she had blood clot, denies redness denies warm to touch. US was in April of last year clot was around knee        HPI:  Yamini Jean-Baptiste is a 37 y.o. female       Right leg edema with intermittent pain     Feels similarly to DVT    Hx of DVT as complication of achilles rupture    Continues on xarelto    F/u u/s a year ago showed persistent occlusive thrombus     No redness or warmth     Drove to [facility] two weeks ago    Started after that    Swelling is not every day     Will get to point where she cant put foot in shoe     Continues wearing compression    Patient Active Problem List   Diagnosis    PCOS (polycystic ovarian syndrome)    PCO (polycystic ovaries)    Achilles rupture, right    Adenopathy    Acute DVT (deep venous thrombosis) ([facility])    Postoperative pain    Endometriosis    Abnormal uterine bleeding (AUB)    Pelvic adhesions       Current Outpatient Medications   Medication Sig Dispense Refill    rivaroxaban (XARELTO) 20 MG TABS tablet Take 1 tablet by mouth daily (with breakfast) 30 tablet 5    acetaminophen (TYLENOL) 500 MG tablet Take 2 tablets by mouth every 6 hours as needed for Pain 60 tablet 0     No current facility-administered medications for this visit. Past Medical History:   Diagnosis Date    Acute DVT (deep venous thrombosis) ([facility] [address] 08/31/2021    Arthritis     Endometriosis 9/23/2022    Hx of blood clots     Pap smear for cervical cancer screening 01/12/2011    neg.     PCO (polycystic ovaries)     PCOS (polycystic ovarian syndrome)     PONV (postoperative nausea and vomiting)      Past Surgical History:   Procedure Laterality Date    ACHILLES TENDON SURGERY Right 08/20/2021    RIGHT ACHILLES TENDON REPAIR performed by Geovanny Silva MD at [address] [facility], VAGINAL N/A 9/22/2022    TOTAL LAPAROSCOPIC HYSTERECTOMY, BILATERAL SALPINGECTOMY LEFT OOPHORECTOMY, CYSTOSCOPY, LAPAROSCOPIC ADHESIOLYSIS performed by Dennise Sloan MD at [address]      TYMPANOSTOMY TUBE PLACEMENT      WISDOM TOOTH EXTRACTION       Family History   Problem Relation Age of Onset    Substance Abuse Father         alcohol    Cancer Paternal Grandfather         throat cancer    High Blood Pressure Mother     Asthma Mother     High Cholesterol Mother     Cancer Maternal Grandfather         lung     Social History     Socioeconomic History    Marital status: Single     Spouse name: None    Number of children: None    Years of education: None    Highest education level: None   Tobacco Use    Smoking status: Former     Packs/day: 0.50     Years: 10.00     Pack years: 5.00     Types: Cigarettes    Smokeless tobacco: Never   Vaping Use    Vaping Use: Never used   Substance and Sexual Activity    Alcohol use: Yes     Comment: occasionally    Drug use: No    Sexual activity: Not Currently     Social Determinants of Health     Financial Resource Strain: Low Risk     Difficulty of Paying Living Expenses: Not hard at all   Food Insecurity: No Food Insecurity    Worried About Running Out of Food in the Last Year: Never true    Ran Out of Food in the Last Year: Never true   Transportation Needs: No Transportation Needs    Lack of Transportation (Medical): No    Lack of Transportation (Non-Medical): No   Housing Stability: Unknown    Unstable Housing in the Last Year: No     Allergies   Allergen Reactions    Codeine Hives and Swelling       Review of Systems:   General ROS: negative for - chills, fatigue, fever, malaise, weight gain or weight loss  Respiratory ROS: no cough, shortness of breath, or wheezing  Cardiovascular ROS: no chest pain or dyspnea on exertion  Gastrointestinal ROS: no abdominal pain, change in bowel habits, or black or bloody stools  Genito-Urinary ROS: no dysuria, trouble voiding  Musculoskeletal ROS: negative for - gait disturbance, joint pain or joint stiffness  Neurological ROS: per HPI  In general patient otherwise reports feeling well. Physical Exam:  /80 (Site: Left Upper Arm)   Pulse 84   Temp 98.5 °F (36.9 °C)   Ht 5' 7\" (1.702 m)   Wt 277 lb (125.6 kg)   LMP 09/19/2022   SpO2 98%   BMI 43.38 kg/m²     Gen: Well, NAD, Alert, Oriented x 3   HEENT: EOMI, eyes clear, MMM  Skin: without rash or jaundice  Neck: no significant lymphadenopathy or thyromegaly  Lungs: CTA B w/out Rales/Wheezes/Rhonchi, Good respiratory effort   Heart: RRR, S1S2, w/out M/R/G, non-displaced PMI   Ext: No C/C/E Bilaterally. Neuro: Neurovascularly intact w/ Sensory/Motor intact UE/LE Bilaterally. Lab Results   Component Value Date    WBC 6.9 09/15/2022    HGB 10.0 (L) 09/15/2022    HCT 32.3 (L) 09/15/2022     (H) 09/15/2022    CHOL 172 06/25/2019    TRIG 67 06/25/2019    HDL 52 06/25/2019    ALT 8 06/25/2019    AST 15 06/25/2019     06/25/2019    K 4.2 06/25/2019     06/25/2019    CREATININE 0.67 06/25/2019    BUN 12 06/25/2019    CO2 20 06/25/2019    TSH 1.430 06/25/2019    LABA1C 5.3 06/25/2019         A&P   Diagnosis Orders   1. Acute deep vein thrombosis (DVT) of proximal vein of lower extremity, unspecified laterality ([facility] [address]  US DUP LOWER EXTREMITY RIGHT ANTOINETTE      2.  Right leg swelling  US DUP LOWER EXTREMITY RIGHT ANTOINETTE          Continue xarelto  Continue compression/elevation   Will get f/u duplex     Consider hypercoag workup if persistent DVT           Guy Umaña MD

## 2023-02-09 ENCOUNTER — HOSPITAL ENCOUNTER (OUTPATIENT)
Dept: ULTRASOUND IMAGING | Age: 43
Discharge: HOME | End: 2023-02-11
Payer: COMMERCIAL

## 2023-02-09 DIAGNOSIS — M79.89 RIGHT LEG SWELLING: ICD-10-CM

## 2023-02-09 DIAGNOSIS — I82.4Y9 ACUTE DEEP VEIN THROMBOSIS (DVT) OF PROXIMAL VEIN OF LOWER EXTREMITY, UNSPECIFIED LATERALITY (HCC): ICD-10-CM

## 2023-02-09 PROCEDURE — 93971 EXTREMITY STUDY: CPT

## 2023-08-03 ENCOUNTER — HOSPITAL ENCOUNTER (OUTPATIENT)
Dept: ULTRASOUND IMAGING | Age: 43
Discharge: HOME | End: 2023-08-05
Attending: INTERNAL MEDICINE
Payer: COMMERCIAL

## 2023-08-03 DIAGNOSIS — I82.431 EMBOLISM AND THROMBOSIS OF RIGHT POPLITEAL VEIN (HCC): ICD-10-CM

## 2023-08-03 PROCEDURE — 93971 EXTREMITY STUDY: CPT

## 2024-01-20 NOTE — ED PROVIDER NOTES
negative. Except as noted above the remainder of the review of systems was reviewed and negative. PAST MEDICAL HISTORY     Past Medical History:   Diagnosis Date    Pap smear for cervical cancer screening 01/12/2011    neg.  PCO (polycystic ovaries)     PCOS (polycystic ovarian syndrome)          SURGICALHISTORY       Past Surgical History:   Procedure Laterality Date    TONSILLECTOMY      TYMPANOSTOMY TUBE PLACEMENT      UTERINE FIBROID SURGERY           CURRENT MEDICATIONS       Previous Medications    No medications on file       ALLERGIES     Codeine    FAMILY HISTORY       Family History   Problem Relation Age of Onset    Substance Abuse Father         alcohol    Cancer Paternal Grandfather         throat cancer    High Blood Pressure Mother     Asthma Mother     High Cholesterol Mother     Cancer Maternal Grandfather         lung          SOCIAL HISTORY       Social History     Socioeconomic History    Marital status: Single     Spouse name: None    Number of children: None    Years of education: None    Highest education level: None   Occupational History    None   Tobacco Use    Smoking status: Current Every Day Smoker     Packs/day: 0.50     Years: 10.00     Pack years: 5.00     Types: Cigarettes    Smokeless tobacco: Never Used   Substance and Sexual Activity    Alcohol use: Yes     Comment: occasionally    Drug use: No    Sexual activity: Not Currently   Other Topics Concern    None   Social History Narrative    None     Social Determinants of Health     Financial Resource Strain: Low Risk     Difficulty of Paying Living Expenses: Not hard at all   Food Insecurity: No Food Insecurity    Worried About Running Out of Food in the Last Year: Never true    Minnie of Food in the Last Year: Never true   Transportation Needs: No Transportation Needs    Lack of Transportation (Medical): No    Lack of Transportation (Non-Medical):  No   Physical Activity:     Days of Exercise per Week:     Minutes of Exercise per Session:    Stress:     Feeling of Stress :    Social Connections:     Frequency of Communication with Friends and Family:     Frequency of Social Gatherings with Friends and Family:     Attends Holiness Services:     Active Member of Clubs or Organizations:     Attends Club or Organization Meetings:     Marital Status:    Intimate Partner Violence:     Fear of Current or Ex-Partner:     Emotionally Abused:     Physically Abused:     Sexually Abused:        SCREENINGS      @FLOW([id number])@      PHYSICAL EXAM    (up to 7 for level 4, 8 or more for level 5)     ED Triage Vitals   BP Temp Temp Source Pulse Resp SpO2 Height Weight   07/31/21 2102 07/31/21 2101 07/31/21 2101 07/31/21 2101 07/31/21 2101 07/31/21 2101 07/31/21 2101 07/31/21 2101   119/79 98 °F (36.7 °C) Oral 107 20 95 % 5' 6\" (1.676 m) 200 lb (90.7 kg)       Physical Exam  Vitals and nursing note reviewed. Constitutional:       Appearance: She is well-developed. HENT:      Head: Normocephalic. Right Ear: Tympanic membrane normal.      Left Ear: Tympanic membrane normal.      Nose: Nose normal.      Mouth/Throat:      Mouth: Mucous membranes are moist.   Eyes:      Conjunctiva/sclera: Conjunctivae normal.      Pupils: Pupils are equal, round, and reactive to light. Cardiovascular:      Rate and Rhythm: Normal rate and regular rhythm. Heart sounds: Normal heart sounds. Pulmonary:      Effort: Pulmonary effort is normal.      Breath sounds: Normal breath sounds. Abdominal:      General: Bowel sounds are normal.      Palpations: Abdomen is soft. Tenderness: There is no abdominal tenderness. There is no guarding. Musculoskeletal:         General: Normal range of motion. Cervical back: Normal range of motion and neck supple. Right lower leg: Tenderness present. No deformity.       Left lower leg: Normal.      Right ankle:      Right Achilles Tendon: Tenderness and defect present. Day's test positive. Skin:     General: Skin is warm and dry. Capillary Refill: Capillary refill takes less than 2 seconds. Neurological:      Mental Status: She is alert and oriented to person, place, and time. Psychiatric:         Mood and Affect: Mood normal.         DIAGNOSTIC RESULTS     EKG: All EKG's are interpreted by the Emergency Department Physician who either signs or Co-signsthis chart in the absence of a cardiologist.      RADIOLOGY:   Berdie Dung such as CT, Ultrasound and MRI are read by the radiologist. Plain radiographic images are visualized and preliminarily interpreted by the emergency physician with the below findings:      Interpretation per the Radiologist below, if available at the time ofthis note:    XR ANKLE RIGHT (MIN 3 VIEWS)    (Results Pending)         ED BEDSIDE ULTRASOUND:   Performed by ED Physician - none    LABS:  Labs Reviewed - No data to display    All other labs were within normal range or not returned as of this dictation. EMERGENCY DEPARTMENT COURSE and DIFFERENTIAL DIAGNOSIS/MDM:   Vitals:    Vitals:    07/31/21 2101 07/31/21 2102 07/31/21 2230   BP:  119/79 117/82   Pulse: 107     Resp: 20     Temp: 98 °F (36.7 °C)     TempSrc: Oral     SpO2: 95%  94%   Weight: 200 lb (90.7 kg)     Height: 5' 6\" (1.676 m)            MDM patient has a clinical Achilles tendon complete tear. He is treated with splinting, crutches and orthopedic follow-up in 2 days. CONSULTS:  None    PROCEDURES:  Unless otherwise noted below, none     Procedures    FINAL IMPRESSION      1.  Rupture of right Achilles tendon, initial encounter          DISPOSITION/PLAN   DISPOSITION Decision To Discharge 07/31/2021 10:48:49 PM      PATIENT REFERRED TO:  Meet Woods MD  [address]  [phone]    In 3 days        DISCHARGE MEDICATIONS:  New Prescriptions    HYDROCODONE-ACETAMINOPHEN (NORCO) 5-325 MG PER TABLET    Take 1 tablet by mouth every 6 hours as needed for Pain for up to 5 days.           (Please note that portions of this note were completed with a voice recognition program.  Efforts were made to edit the dictations but occasionally words are mis-transcribed.)    Orion Dangelo MD (electronically signed)  Attending Emergency Physician          Orion Dangelo MD  07/31/21 1552 No Vaccines Administered.

## 2024-08-28 ENCOUNTER — OFFICE VISIT (OUTPATIENT)
Dept: FAMILY MEDICINE CLINIC | Age: 44
End: 2024-08-28

## 2024-08-28 VITALS
OXYGEN SATURATION: 96 % | BODY MASS INDEX: 43.32 KG/M2 | HEIGHT: 67 IN | WEIGHT: 276 LBS | HEART RATE: 87 BPM | SYSTOLIC BLOOD PRESSURE: 120 MMHG | DIASTOLIC BLOOD PRESSURE: 86 MMHG

## 2024-08-28 DIAGNOSIS — M70.61 GREATER TROCHANTERIC BURSITIS OF RIGHT HIP: Primary | ICD-10-CM

## 2024-08-28 DIAGNOSIS — M21.371 RIGHT FOOT DROP: ICD-10-CM

## 2024-08-28 DIAGNOSIS — M54.50 LUMBAR SPINE PAIN: ICD-10-CM

## 2024-08-28 DIAGNOSIS — M67.951 TENDINOPATHY OF RIGHT GLUTEAL REGION: ICD-10-CM

## 2024-08-28 DIAGNOSIS — S39.012A STRAIN OF LUMBAR REGION, INITIAL ENCOUNTER: ICD-10-CM

## 2024-08-28 DIAGNOSIS — M25.551 RIGHT HIP PAIN: ICD-10-CM

## 2024-08-28 DIAGNOSIS — M47.816 LUMBAR SPONDYLOSIS: ICD-10-CM

## 2024-08-28 RX ORDER — LIDOCAINE HYDROCHLORIDE 10 MG/ML
2 INJECTION, SOLUTION INFILTRATION; PERINEURAL ONCE
Status: COMPLETED | OUTPATIENT
Start: 2024-08-28 | End: 2024-08-28

## 2024-08-28 RX ORDER — TRIAMCINOLONE ACETONIDE 40 MG/ML
80 INJECTION, SUSPENSION INTRA-ARTICULAR; INTRAMUSCULAR ONCE
Status: COMPLETED | OUTPATIENT
Start: 2024-08-28 | End: 2024-08-28

## 2024-08-28 RX ORDER — NAPROXEN SODIUM 550 MG/1
550 TABLET ORAL 2 TIMES DAILY WITH MEALS
Qty: 60 TABLET | Refills: 1 | Status: SHIPPED | OUTPATIENT
Start: 2024-08-28

## 2024-08-28 RX ADMIN — LIDOCAINE HYDROCHLORIDE 2 ML: 10 INJECTION, SOLUTION INFILTRATION; PERINEURAL at 16:00

## 2024-08-28 RX ADMIN — TRIAMCINOLONE ACETONIDE 80 MG: 40 INJECTION, SUSPENSION INTRA-ARTICULAR; INTRAMUSCULAR at 16:00

## 2024-08-28 SDOH — ECONOMIC STABILITY: FOOD INSECURITY: WITHIN THE PAST 12 MONTHS, YOU WORRIED THAT YOUR FOOD WOULD RUN OUT BEFORE YOU GOT MONEY TO BUY MORE.: NEVER TRUE

## 2024-08-28 SDOH — ECONOMIC STABILITY: FOOD INSECURITY: WITHIN THE PAST 12 MONTHS, THE FOOD YOU BOUGHT JUST DIDN'T LAST AND YOU DIDN'T HAVE MONEY TO GET MORE.: NEVER TRUE

## 2024-08-28 SDOH — ECONOMIC STABILITY: INCOME INSECURITY: HOW HARD IS IT FOR YOU TO PAY FOR THE VERY BASICS LIKE FOOD, HOUSING, MEDICAL CARE, AND HEATING?: NOT HARD AT ALL

## 2024-08-28 ASSESSMENT — PATIENT HEALTH QUESTIONNAIRE - PHQ9
SUM OF ALL RESPONSES TO PHQ QUESTIONS 1-9: 0
SUM OF ALL RESPONSES TO PHQ9 QUESTIONS 1 & 2: 0
SUM OF ALL RESPONSES TO PHQ QUESTIONS 1-9: 0
2. FEELING DOWN, DEPRESSED OR HOPELESS: NOT AT ALL
1. LITTLE INTEREST OR PLEASURE IN DOING THINGS: NOT AT ALL

## 2024-08-28 ASSESSMENT — ENCOUNTER SYMPTOMS: BACK PAIN: 1

## 2024-08-28 NOTE — PATIENT INSTRUCTIONS
your chest. Keep your other foot flat on the floor.  Keep your lower back pressed to the floor. Hold the stretch for at least 15 to 30 seconds.  Relax and lower the knee to the starting position.  Repeat 2 to 4 times with each leg.  To get more stretch, put your other leg flat on the floor while pulling your knee to your chest.  Hip flexor stretch (kneeling)    Kneel on your affected leg and bend your other leg out in front of you, with that foot flat on the floor. If you feel discomfort in the front of your knee, place a towel under your knee.  Keeping your back straight, slowly push your hips forward. You should feel a stretch in the upper thigh of your back leg and hip.  Hold the stretch for at least 15 to 30 seconds.  Repeat 2 to 4 times.  It's a good idea to repeat these steps with your other leg.  Press-up back extension    Lie on your stomach, supporting your body with your forearms. Keep your elbows below your shoulders.  Press your elbows down into the floor to raise your upper back. As you do this, relax your stomach muscles and allow your back to arch without using your back muscles. Don't let your hips or pelvis come off the floor.  Hold for 15 to 30 seconds, then relax.  Repeat 2 to 4 times.  Pelvic tilt    Lie on your back with your knees bent and your feet flat on the floor.  Tighten your belly muscles by pulling your belly button in toward your spine. Press your lower back to the floor. You should feel your hips and pelvis rock back.  Hold for about 6 seconds while breathing smoothly, and then relax.  Repeat 8 to 12 times.  Alternate arm and leg (bird dog)    Start on the floor, on your hands and knees.  Tighten your belly muscles by pulling your belly button in toward your spine. Be sure you continue to breathe normally and do not hold your breath.  Keeping your back and neck straight, raise one arm off the floor and hold it straight out in front of you. Be careful not to let your shoulder drop  down, because that will twist your trunk.  Hold for about 6 seconds, then lower your arm and switch to your other arm. Over time, work up to holding for 10 to 30 seconds each time.  Repeat 8 to 12 times with each arm.  When you feel steady and strong doing this exercise with your arms, try doing the exercise with your legs instead. Raise one leg and hold it straight out behind you. Be careful not to let your hip drop down, because that will twist your trunk.  When holding your leg straight out becomes easier, try raising your opposite arm at the same time.  Curl-up (arms crossed)    Lie on your back on the floor, with your knees bent and your feet flat on the floor. (Or you can lie on any flat surface, such as a bed.)  Cross your arms over your chest. If this bothers your neck, try putting your hands behind your neck (not your head), with your elbows spread apart.  Slowly tighten your belly muscles and raise your head and shoulder blades off the floor. Keep breathing normally and don't hold your breath.  Keep your neck straight and do not press your chin to your chest.  Hold for 1 to 2 seconds. Then slowly lower yourself back to the floor.  Repeat 8 to 12 times.  Bridging (heel dig)    Lie on your back with both knees bent and your ankles bent so that only your heels are digging into the floor. Your knees should be bent about 90 degrees.  Tighten your belly muscles by pulling your belly button in toward your spine. Keep breathing normally and don't hold your breath.  Push your heels into the floor, squeeze your buttocks, and lift your hips off the floor until your shoulders, hips, and knees are all in a straight line. Keep your hips level.  Hold for about 6 seconds.  Slowly lower your hips back to the floor.  Repeat 8 to 12 times.  Back press    Stand with your back 10 to 12 inches away from a wall.  Lean into the wall until your back is against it. Press your lower back against the wall by pulling in your stomach

## 2024-08-28 NOTE — PROGRESS NOTES
medications, physical therapy, greater trochanteric steroid injection.  All questions answered.    -Follow-up in 6 weeks      -Discussed consideration for injection today, due to decrease in activity and increased pain patient would gain significant improvement from an injection versus conservative measures alone.  Patient agreed to the injection, verbal consent was given.      Prior to procedure the patient was verified, location was verified, laterality was verified, and the procedure itself was verified    Before aspiration/injection risks and benefits of a cortisone injection including infection, local skin irritation, skin atrophy, continued pain/discomfort, elevated blood sugar were discussed with the patient.    Patient verbalized understanding and wanted to proceed with planned procedure. Verbal consent was given.     Prior to injection the location was prepped in usual sterile fashion. No complications or concerns during the procedure. Patient tolerated the procedure well.    Post-procedure discomfort can be alleviated with additional medications/ice/elevation/rest over the first 24 hours as recommended.      If fluid was aspirated that was abnormal it was sent for further studies in sterile specimen container to the lab.      Orders Placed This Encounter   Medications    lidocaine 1 % injection 2 mL    triamcinolone acetonide (KENALOG-40) injection 80 mg    naproxen sodium (ANAPROX) 550 MG tablet     Sig: Take 1 tablet by mouth 2 times daily (with meals)     Dispense:  60 tablet     Refill:  1        Subjective      Review of Systems   Musculoskeletal:  Positive for arthralgias, back pain, gait problem and myalgias. Negative for joint swelling and neck pain.        Right hip and thigh pain, lower back pain   Skin:  Negative for wound.   Neurological:  Negative for dizziness, weakness and numbness.      Allergies   Allergen Reactions    Codeine Hives and Swelling       Current Outpatient Medications:      coordination of care.    I have reviewed the patient's medical history in detail and updated the computerized patient record.    Objective     Vitals:    08/28/24 1556   BP: 120/86   Site: Right Upper Arm   Position: Sitting   Cuff Size: Large Adult   Pulse: 87   SpO2: 96%   Weight: 125.2 kg (276 lb)   Height: 1.702 m (5' 7\")        Physical Exam  Musculoskeletal:      Lumbar back: Spasms and tenderness present. No bony tenderness. Decreased range of motion.      Right hip: Tenderness and bony tenderness present. Decreased range of motion. Decreased strength.      Comments: -No tenderness to palpation of midline lumbar spine, pain is localized to the right lumbar musculature and right SI joint region.  Bilateral lower extremity strength is 5/5, reflexes 2+ bilaterally, no sensation deficits during my exam.  Full range of motion at the waist, pain worsens with extension of the lumbar spine.  Slump test negative  -Tenderness to palpation at the greater trochanter, mild weakness associated with resistance to external rotation and abduction of the right hip.  Pain along the gluteal tendon attachments.             Please note, this report has been partially produced using speech recognition software and may cause  and /or contain errors related to that system including grammar, punctuation and spelling as well as words and phrases that may seem inappropriate. If there are questions or concerns please feel free to contact me to clarify.    Donal Fall MD

## (undated) DEVICE — SPONGE,LAP,18"X18",DLX,XR,ST,5/PK,40/PK: Brand: MEDLINE

## (undated) DEVICE — DRAPE, LAVH, STERILE: Brand: MEDLINE

## (undated) DEVICE — TROCAR: Brand: KII FIOS FIRST ENTRY

## (undated) DEVICE — LINER PAD CONTOUR SUPER PEACH 7X14IN

## (undated) DEVICE — SHEARS ENDOSCP HARM 36CM ULTRASONIC CRV TIP UPGRD

## (undated) DEVICE — GAUZE,SPONGE,4"X4",16PLY,XRAY,STRL,LF: Brand: MEDLINE

## (undated) DEVICE — COUNTER NDL 40 COUNT HLD 70 FOAM BLK ADH W/ MAG

## (undated) DEVICE — TOWEL,OR,DSP,ST,BLUE,STD,4/PK,20PK/CS: Brand: MEDLINE

## (undated) DEVICE — SYRINGE IRRIG 60ML SFT PLIABLE BLB EZ TO GRP 1 HND USE W/

## (undated) DEVICE — ZIMMER® STERILE DISPOSABLE TOURNIQUET CUFF, DUAL PORT, SINGLE BLADDER, 24 IN. (61 CM)

## (undated) DEVICE — ELECTRODE PT RET AD L9FT HI MOIST COND ADH HYDRGEL CORDED

## (undated) DEVICE — WARMER SCP 2 ANTIFOG LAP DISP

## (undated) DEVICE — INTENDED FOR TISSUE SEPARATION, AND OTHER PROCEDURES THAT REQUIRE A SHARP SURGICAL BLADE TO PUNCTURE OR CUT.: Brand: BARD-PARKER ® CARBON RIB-BACK BLADES

## (undated) DEVICE — DEVICE TRCR 12X9X3IN WHT CLSR DISP OMNICLOSE

## (undated) DEVICE — Device

## (undated) DEVICE — BANDAGE,ELASTIC,ESMARK,STERILE,6"X9',LF: Brand: MEDLINE

## (undated) DEVICE — ABSORBENT ROLL ECODRI-SAFE

## (undated) DEVICE — CATHETER URETH 16FR BLLN 5CC STD LTX 3 W TWO OPP DRNGE EYE

## (undated) DEVICE — GOWN,AURORA,NONRNF,XL,30/CS: Brand: MEDLINE

## (undated) DEVICE — APPLICATOR MEDICATED 26 CC SOLUTION HI LT ORNG CHLORAPREP

## (undated) DEVICE — PAD,ABDOMINAL,8"X10",ST,LF: Brand: MEDLINE

## (undated) DEVICE — GOWN,AURORA,NONREINFORCED,LARGE: Brand: MEDLINE

## (undated) DEVICE — TUBING, SUCTION, 1/4" X 10', STRAIGHT: Brand: MEDLINE

## (undated) DEVICE — SPLINT CAST W5XL30IN GRN STRENGTH PLSTR OF PARIS FAST SET

## (undated) DEVICE — BANDAGE COMPR M W6INXL10YD WHT BGE VELC E MTRX HK AND LOOP

## (undated) DEVICE — NEEDLE INSUF L120MM ULT VERES ENDOPATH

## (undated) DEVICE — SUTURE MCRYL SZ 3-0 L27IN ABSRB UD L19MM PS-2 3/8 CIR PRIM Y427H

## (undated) DEVICE — TOTAL TRAY, DB, 100% SILI FOLEY, 16FR 10: Brand: MEDLINE

## (undated) DEVICE — 4-PORT MANIFOLD: Brand: NEPTUNE 2

## (undated) DEVICE — COVER LT HNDL BLU PLAS

## (undated) DEVICE — COTTON UNDERCAST PADDING,REGULAR FINISH: Brand: WEBRIL

## (undated) DEVICE — KIT,ANTI FOG,W/SPONGE & FLUID,SOFT PACK: Brand: MEDLINE

## (undated) DEVICE — TUBING INSUFFLATION SMK EVAC HI FLO SET PNEUMOCLEAR

## (undated) DEVICE — BANDAGE COBAN 4 IN COMPR W4INXL5YD FOAM COHESIVE QUIK STK SELF ADH SFT

## (undated) DEVICE — NEPTUNE E-SEP SMOKE EVACUATION PENCIL, COATED, 70MM BLADE, PUSH BUTTON SWITCH: Brand: NEPTUNE E-SEP

## (undated) DEVICE — DRESSING PETRO W3XL8IN OIL EMUL N ADH GZ KNIT IMPREG CELOS

## (undated) DEVICE — SUTURE ORTHOCORD SZ 2 L36IN NONABSORBABLE VLT BLU BRAID TIE 223113

## (undated) DEVICE — PACK,BASIC: Brand: MEDLINE

## (undated) DEVICE — PAD BD FOAM 33X72X2.75 IN BLU EGGCRATE

## (undated) DEVICE — GLOVE ORANGE PI 8   MSG9080

## (undated) DEVICE — SKIN PREP TRAY 4 COMPARTM TRAY: Brand: MEDLINE INDUSTRIES, INC.

## (undated) DEVICE — ADHESIVE SKIN CLSR 0.7ML TOP DERMBND ADV

## (undated) DEVICE — SUTURE ETHBND EXCEL SZ 2 L30IN NONABSORBABLE GRN L40MM V-37 MX69G

## (undated) DEVICE — DISSECTOR LAP DIA5MM BLNT TIP ENDOPATH

## (undated) DEVICE — SUTURE VCRL SZ 2-0 L27IN ABSRB UD L26MM CT-2 1/2 CIR J269H

## (undated) DEVICE — SINGLE PORT MANIFOLD: Brand: NEPTUNE 2

## (undated) DEVICE — SUTURE PROL SZ 3-0 L30IN NONABSORBABLE BLU L26MM SH 1/2 CIR 8832H

## (undated) DEVICE — MARKER SURG SKIN GENTIAN VLT REG TIP W/ 6IN RUL

## (undated) DEVICE — GLOVE SURG SZ 85 L12IN FNGR THK94MIL TRNSLUC YEL LTX

## (undated) DEVICE — GLOVE ORANGE PI 8 1/2   MSG9085

## (undated) DEVICE — SYRINGE MED 10ML TRNSLUC BRL PLUNG BLK MRK POLYPR CTRL

## (undated) DEVICE — PLUG,CATHETER,DRAINAGE PROTECTOR,TUBE: Brand: MEDLINE

## (undated) DEVICE — PADDING UNDERCAST W6INXL4YD RAYON POLY SYN NONADHESIVE

## (undated) DEVICE — COVER,TABLE,44X90,STERILE: Brand: MEDLINE

## (undated) DEVICE — SUTURE MCRYL SZ 4-0 L27IN ABSRB UD L19MM PS-2 1/2 CIR PRIM Y426H

## (undated) DEVICE — GOWN,SIRUS,POLYRNF,BRTHSLV,XLN/XL,20/CS: Brand: MEDLINE

## (undated) DEVICE — TROCAR: Brand: KII® SLEEVE

## (undated) DEVICE — POUCH, INSTRUMENT, 3POCKET, INVISISHIELD: Brand: MEDLINE

## (undated) DEVICE — VCARE MEDIUM, UTERINE MANIPULATOR, VAGINAL-CERVICAL-AHLUWALIA'S-RETRACTOR-ELEVATOR: Brand: VCARE

## (undated) DEVICE — LABEL MED MINI W/ MARKER

## (undated) DEVICE — SPONGE GZ W4XL4IN COT 12 PLY TYP VII WVN C FLD DSGN

## (undated) DEVICE — GLOVE SURG SZ 9 THK91MIL LTX FREE SYN POLYISOPRENE ANTI

## (undated) DEVICE — PACK ARTHRO III ST SIRUS